# Patient Record
Sex: MALE | Race: WHITE | NOT HISPANIC OR LATINO | URBAN - METROPOLITAN AREA
[De-identification: names, ages, dates, MRNs, and addresses within clinical notes are randomized per-mention and may not be internally consistent; named-entity substitution may affect disease eponyms.]

---

## 2020-11-04 ENCOUNTER — OFFICE VISIT (OUTPATIENT)
Dept: ORTHOPEDIC SURGERY | Facility: CLINIC | Age: 55
End: 2020-11-04

## 2020-11-04 VITALS — WEIGHT: 261.2 LBS | OXYGEN SATURATION: 99 % | HEART RATE: 76 BPM | HEIGHT: 71 IN | BODY MASS INDEX: 36.57 KG/M2

## 2020-11-04 DIAGNOSIS — S46.012A RUPTURE OF LEFT SUPRASPINATUS TENDON, INITIAL ENCOUNTER: ICD-10-CM

## 2020-11-04 DIAGNOSIS — M25.512 LEFT SHOULDER PAIN, UNSPECIFIED CHRONICITY: Primary | ICD-10-CM

## 2020-11-04 DIAGNOSIS — Z02.6 ENCOUNTER RELATED TO WORKER'S COMPENSATION CLAIM: ICD-10-CM

## 2020-11-04 PROCEDURE — 99204 OFFICE O/P NEW MOD 45 MIN: CPT | Performed by: ORTHOPAEDIC SURGERY

## 2020-11-04 RX ORDER — LISINOPRIL AND HYDROCHLOROTHIAZIDE 12.5; 1 MG/1; MG/1
1 TABLET ORAL DAILY
COMMUNITY
Start: 2020-10-01

## 2020-11-04 NOTE — PROGRESS NOTES
[facility] Note    Subjective     Chief Complaint   Patient presents with   • Left Shoulder - Pain        HPI  NOE Henderson is a 55 y.o. male who presents with new problem of: left shoulder pain.  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 The issue has been ongoing for 4 week(s). Pain is a 5/10 on the pain scale. Pain is described as dull, burning and stabbing. Associated symptoms include pain and popping. The pain is worse with certain movements; ice and heat improve the pain. Previous treatments have included: nothing.    I have reviewed the following portions of the patient's history:History of Present Illness and review of systems.    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 No pertinent family history.  Social History     Socioeconomic History   • Marital status:      Spouse name: Not on file   • Number of children: Not on file   • Years of education: Not on file   • Highest education level: Not on file   Tobacco Use   • Smoking status: Never Smoker   • Smokeless tobacco: Former User     Types: Snuff   Substance and Sexual Activity   • Alcohol use: Yes     Alcohol/week: 2.0 standard drinks     Types: 2 Standard drinks or equivalent per week   • Drug use: No   • Sexual activity: Yes     Partners: Female     Birth control/protection: None      Current Outpatient Medications on File Prior to Visit   Medication Sig Dispense Refill   • lisinopril-hydrochlorothiazide (PRINZIDE,ZESTORETIC) 10-12.5 MG per tablet Take 1 tablet by mouth Daily.       No current facility-administered medications on file prior to visit.       No Known Allergies     The following portions  "of the patient's history were reviewed and updated as appropriate: allergies, current medications, past family history, past medical history, past social history, past surgical history and problem list.    Review of Systems   Constitutional: Negative.    HENT: Negative.    Eyes: Negative.    Respiratory: Negative.    Cardiovascular: Negative.    Gastrointestinal: Negative.    Endocrine: Negative.    Genitourinary: Negative.    Musculoskeletal: Positive for arthralgias.   Skin: Negative.    Allergic/Immunologic: Negative.    Neurological: Negative.    Hematological: Negative.    Psychiatric/Behavioral: Negative.         Objective      Physical Exam  Pulse 76   Ht 180.3 cm (70.98\")   Wt 118 kg (261 lb 3.2 oz)   SpO2 99%   BMI 36.45 kg/m²     Body mass index is 36.45 kg/m².    GENERAL APPEARANCE: awake, alert & oriented x 3, in no acute distress and well developed, well nourished  PSYCH: normal mood and affect  LUNGS:  breathing nonlabored, no wheezing  EYES: sclera anicteric, pupils equal  CARDIOVASCULAR: palpable pulses. Capillary refill less than 2 seconds  INTEGUMENTARY: skin intact, no clubbing, cyanosis  NEUROLOGIC:  Normal Sensation and reflexes       Ortho Exam  Musculoskeletal   Upper Extremity   Left Shoulder     Inspection and Palpation:     Tenderness - none     Crepitus - none    Sensation is normal    Examination reveals no ecchymosis.      Strength and Tone:    Supraspinatus, Infraspinatus - 5/5    Subscapularis - 5/5    Deltoid - 5/5   Range of Motion   Right shoulder:    Internal Rotation: ROM - T7    External Rotation: AROM - 80 degrees    Elevation through flexion: AROM - 180 degrees     Abduction - 180   Left Shoulder:    Internal Rotation: ROM - T7    External Rotation: AROM - 80 degrees    Elevation through flexion: AROM - 180 degrees     Abduction - 180 degrees   Instability   Left shoulder    Sulcus sign negative    Apprehension test negative    Chilo relocation test negative    Jerk test " negative   Impingement   Left shoulder    Howard impingement test positive    Neer impingement test positive   Functional Testing   Left shoulder    AC crossover adduction test negative    Abdominal compression test negative    Lift-off sign negative    Speed's test negative    Thacker's test negative    Horriblower's sign negative     Imaging/Studies  Imaging Results (Last 7 Days)     Procedure Component Value Units Date/Time    XR Shoulder 2+ View Left [[id number]] Resulted: 11/04/20 1030     Updated: 11/04/20 1031    Narrative:      Left Shoulder X-Ray  Indication: Pain  AP, scapular Y, and axillary lateral views    Findings:  No fracture  No bony lesion  Normal soft tissues  Normal joint spaces    No prior studies were available for comparison.        I viewed his MRI report from October 22 which shows a complete full-thickness tear of the supraspinatus and bursitis.  He is going to get the disc for me to review his images later.  I only have the report so far.  Assessment/Plan        ICD-10-CM ICD-9-CM   1. Left shoulder pain, unspecified chronicity  M25.512 719.41   2. Encounter related to worker's compensation claim  Z02.6 V70.3   3. Rupture of left supraspinatus tendon, initial encounter  S46.012A 840.6       Orders Placed This Encounter   Procedures   • XR Shoulder 2+ View Left   • Ambulatory Referral to Physical Therapy Evaluate and treat, Ortho      The plan is for 3 more weeks of physical therapy and exercise.  Continue work restrictions of use of the joystick only on the endloader and not the steering wheel.  If he is not better in 3 weeks I would recommend surgery.  If he is better we will continue therapy until he is well.    Medical Decision Making  Management Options : over-the-counter medicine and physical/occupational therapy  Data/Risk: radiology tests and independent visualization of imaging, lab tests, or EMG/NCV    Tim Mckay MD  11/04/20  11:08 EST         EMR Dragon/Transcription  disclaimer:  Much of this encounter note is an electronic transcription of spoken language to printed text. Electronic transcription of spoken language may permit erroneous, or at times, nonsensical words or phrases to be inadvertently transcribed. Although I have reviewed the note for such errors, some may still exist.

## 2020-12-02 ENCOUNTER — TREATMENT (OUTPATIENT)
Dept: PHYSICAL THERAPY | Facility: CLINIC | Age: 55
End: 2020-12-02

## 2020-12-02 DIAGNOSIS — M25.512 ACUTE PAIN OF LEFT SHOULDER: Primary | ICD-10-CM

## 2020-12-02 DIAGNOSIS — R53.1 WEAKNESS: ICD-10-CM

## 2020-12-02 DIAGNOSIS — M25.60 RANGE OF MOTION DEFICIT: ICD-10-CM

## 2020-12-02 PROCEDURE — 97110 THERAPEUTIC EXERCISES: CPT | Performed by: PHYSICAL THERAPIST

## 2020-12-02 PROCEDURE — 97140 MANUAL THERAPY 1/> REGIONS: CPT | Performed by: PHYSICAL THERAPIST

## 2020-12-02 PROCEDURE — 97112 NEUROMUSCULAR REEDUCATION: CPT | Performed by: PHYSICAL THERAPIST

## 2020-12-02 NOTE — PROGRESS NOTES
Physical Therapy Daily Progress Note      Patient: NOE Henderson   : 1965  Referring practitioner: Tim Mckay MD  Date of Initial Visit: Type: THERAPY  Noted: 2020  Today's Date: 2020  Patient seen for 1 sessions           Subjective Evaluation    History of Present Illness  Mechanism of injury: The pt stated that he lifted his arms over his head on  and experienced a pop in his L shoulder that resulted in an increase in pain and reduction in motion. He has had a general increase in pain at baseline along with increased sharp pain with elevation past appx 70 deg. He described the sensation as a very tight feeling and believes his shoulder is swollen. He tried to manage with ice and IcyHot but did not feel it helped. He had been doing very well with his HEP but has had trouble performing wall slides since Thursday.     Pain  Current pain ratin  At worst pain ratin  Location: L shoulder           Objective          Tenderness     Left Shoulder   No tenderness in the AC joint, biceps tendon (proximal), infraspinatus tendon and supraspinatus tendon.     Active Range of Motion   Left Shoulder   Flexion: 168 degrees   Abduction: 148 degrees with pain    Passive Range of Motion     Additional Passive Range of Motion Details  Full PROM with no pain    Tests     Left Shoulder   Positive full can and Hawkin's (somewhat provocative; not sharp).   Negative empty can.       See Exercise, Manual, and Modality Logs for complete treatment.       Assessment & Plan     Assessment  Assessment details: The pt presented to PT with a recent exacerbation of L shoulder pain that he experienced after raising his arm over his head to stretch on Thursday. He had no TTP in any of the cuff tendons but activation of the SS was painful for the first time today and it is possible that he has a new cuff strain, although I would like for the injury to calm down before jumping to conclusions. Pain  was reproduced in the clinic most frequently with activation of the external rotators and it is also likely he experienced an exacerbation of his current injury. He displayed compensations from the UT causing excessive scapular elevation when trying to raise his arm today and was educated on the importance of reducing this. This compensation had not been observed in his IE and is likely a response to pain. He had pain with [facility] wall slides so they were modified to a table slide and pain was reduced. MT was performed for the first time today to mobilize the capsule and was tolerated well up to end ranges, but end range flexion and abduction caused some degree of pain and was avoided today. He displayed appropriate performance of all other HEP exercises today but did require cuing for scapular position with fatigue.     Plan  Plan details: Assess new shoulder pain and progress HEP exercises as tolerated.         Visit Diagnoses:    ICD-10-CM ICD-9-CM   1. Acute pain of left shoulder  M25.512 719.41   2. Range of motion deficit  M25.60 719.50   3. Weakness  R53.1 780.79       Progress per Plan of Care           Timed:  Manual Therapy:    8     mins  75701;  Therapeutic Exercise:    28     mins  36991;     Neuromuscular Marina:    10    mins  56413;    Therapeutic Activity:     0     mins  52428;     Gait Trainin     mins  19717;     Ultrasound:     0     mins  67456;    Electrical Stimulation:    0     mins  16757 ( );    Untimed:  Electrical Stimulation:    0     mins  22559 ( );  Mechanical Traction:    0     mins  45193;     Timed Treatment:   46   mins   Total Treatment:     46   mins  Olvin Romero PT  Physical Therapist

## 2020-12-08 ENCOUNTER — TREATMENT (OUTPATIENT)
Dept: PHYSICAL THERAPY | Facility: CLINIC | Age: 55
End: 2020-12-08

## 2020-12-08 DIAGNOSIS — M25.60 RANGE OF MOTION DEFICIT: ICD-10-CM

## 2020-12-08 DIAGNOSIS — R53.1 WEAKNESS: ICD-10-CM

## 2020-12-08 DIAGNOSIS — M25.512 ACUTE PAIN OF LEFT SHOULDER: Primary | ICD-10-CM

## 2020-12-08 PROCEDURE — 97110 THERAPEUTIC EXERCISES: CPT | Performed by: PHYSICAL THERAPIST

## 2020-12-08 PROCEDURE — 97140 MANUAL THERAPY 1/> REGIONS: CPT | Performed by: PHYSICAL THERAPIST

## 2020-12-08 PROCEDURE — 97112 NEUROMUSCULAR REEDUCATION: CPT | Performed by: PHYSICAL THERAPIST

## 2020-12-08 NOTE — PROGRESS NOTES
Physical Therapy Daily Progress Note      Patient: NOE Henderson   : 1965  Referring practitioner: Tim Mckay MD  Date of Initial Visit: Type: THERAPY  Noted: 2020  Today's Date: 2020  Patient seen for 2 sessions           Subjective Evaluation    History of Present Illness  Mechanism of injury: The pt stated that his L shoulder has been feeling much better this week and reported he has recovered 99% from his recent exacerbation. He feels that his motion has returned and stated that pain has significantly decreased. He continues to report a pinching pain with reaching outside of his EMANUEL but noted it is mild and short-lived. He feels he is able to perform most of his household tasks without pain but struggles with higher intensity activities such as lifting heavy weight or reaching overhead with objects.    Pain  Current pain rating: 3  Location: L shoulder           Objective          Active Range of Motion   Left Shoulder   Flexion: 165 degrees   Abduction: 168 degrees       See Exercise, Manual, and Modality Logs for complete treatment.       Assessment & Plan     Assessment  Assessment details: The pt presented to PT with a reduction in shoulder pain compared to last week and seems to have recovered from his acute exacerbation and returned to his baseline of disability. He displayed good improvement into AROM into flexion and abduction today and had no painful arc or symptoms at end-range as has been observed in the past. He did a very good job stabilizing at the scapula when performing UE elevation and was aware of UT compensations when present and was able to self-correct. He displayed early onset of cuff fatigue with exercise and attempted to compensate with deltoid activation, but was cued out of it and encouraged to rest and reset. His HEP was progressed to include resisted wall slides, SL ER, and stretching of the pec mm to work on cuff strengthening and postural  reeducation.     Plan  Plan details: Continue progressions of cuff strengthening exercises as tolerated.         Visit Diagnoses:    ICD-10-CM ICD-9-CM   1. Acute pain of left shoulder  M25.512 719.41   2. Range of motion deficit  M25.60 719.50   3. Weakness  R53.1 780.79       Progress per Plan of Care           Timed:  Manual Therapy:    8     mins  71267;  Therapeutic Exercise:    22     mins  61693;     Neuromuscular Marina:    8    mins  55330;    Therapeutic Activity:     0     mins  69081;     Gait Trainin     mins  36013;     Ultrasound:     0     mins  91771;    Electrical Stimulation:    0     mins  35397 ( );    Untimed:  Electrical Stimulation:    0     mins  48065 ( );  Mechanical Traction:    0     mins  29703;     Timed Treatment:   38   mins   Total Treatment:     38   mins  Olvin Romero PT  Physical Therapist

## 2020-12-11 ENCOUNTER — TREATMENT (OUTPATIENT)
Dept: PHYSICAL THERAPY | Facility: CLINIC | Age: 55
End: 2020-12-11

## 2020-12-11 DIAGNOSIS — M25.512 ACUTE PAIN OF LEFT SHOULDER: Primary | ICD-10-CM

## 2020-12-11 DIAGNOSIS — M25.60 RANGE OF MOTION DEFICIT: ICD-10-CM

## 2020-12-11 DIAGNOSIS — R53.1 WEAKNESS: ICD-10-CM

## 2020-12-11 PROCEDURE — 97112 NEUROMUSCULAR REEDUCATION: CPT | Performed by: PHYSICAL THERAPIST

## 2020-12-11 PROCEDURE — 97110 THERAPEUTIC EXERCISES: CPT | Performed by: PHYSICAL THERAPIST

## 2020-12-11 PROCEDURE — 97140 MANUAL THERAPY 1/> REGIONS: CPT | Performed by: PHYSICAL THERAPIST

## 2020-12-11 NOTE — PROGRESS NOTES
Physical Therapy Daily Progress Note      Patient: NOE Henderson   : 1965  Referring practitioner: Tim Mckay MD  Date of Initial Visit: Type: THERAPY  Noted: 2020  Today's Date: 2020  Patient seen for 3 sessions           Subjective Evaluation    History of Present Illness  Mechanism of injury: The pt stated that he felt very fatigued after his last visit and has noticed increased soreness for the past couple of days. He also feels that his stiffness has worsened and reported a decrease in AROM. He has been compliant with HEP although he noted he has not been able to perform full reps due to irritation in his shoulder. He reported particular difficulty with SL ER.    Pain  Current pain ratin  At worst pain rating: 3  Location: L shoulder           Objective          Active Range of Motion   Left Shoulder   Flexion: 170 degrees   Abduction: 175 degrees   External rotation 0°: 20 degrees       See Exercise, Manual, and Modality Logs for complete treatment.       Assessment & Plan     Assessment  Assessment details: The pt displayed a reduction in ER AROM today although PROM remained full, pointing to cuff fatigue and dysfunction. He continues to have tightness of the posterior capsule and posterior corner mm so MT was directed at post jt mobs and stretching into IR. He was shown a sleeper stretch that was added to his HEP. SL ER proved very challenging today so it was modified to an AAROM with a cane and return to an isometric ER at the wall. AROM into flexion and abduction remained full so I prescribed exercises to strengthen into these positions but excessive scapular elevation was noted in the OKC with any amount of weight. These were left as CKC exercises with resistance added via a theraband. Ice was applied following exercise to manage symptoms.     Plan  Plan details: Continue progressions of AAROM exercises for ER and strengthening into flexion and extension.          Visit Diagnoses:    ICD-10-CM ICD-9-CM   1. Acute pain of left shoulder  M25.512 719.41   2. Weakness  R53.1 780.79   3. Range of motion deficit  M25.60 719.50       Progress per Plan of Care           Timed:  Manual Therapy:    10     mins  69673;  Therapeutic Exercise:    20     mins  70336;     Neuromuscular Marina:    10    mins  04574;    Therapeutic Activity:     0     mins  70414;     Gait Trainin     mins  32659;     Ultrasound:     0     mins  26165;    Electrical Stimulation:    0     mins  29079 ( );    Untimed:  Electrical Stimulation:    0     mins  04679 ( );  Mechanical Traction:    0     mins  19006;     Timed Treatment:   40   mins   Total Treatment:     52   mins  Olvin Romero PT  Physical Therapist

## 2020-12-16 ENCOUNTER — TREATMENT (OUTPATIENT)
Dept: PHYSICAL THERAPY | Facility: CLINIC | Age: 55
End: 2020-12-16

## 2020-12-16 ENCOUNTER — OFFICE VISIT (OUTPATIENT)
Dept: ORTHOPEDIC SURGERY | Facility: CLINIC | Age: 55
End: 2020-12-16

## 2020-12-16 VITALS — OXYGEN SATURATION: 98 % | BODY MASS INDEX: 36.54 KG/M2 | HEART RATE: 76 BPM | WEIGHT: 261 LBS | HEIGHT: 71 IN

## 2020-12-16 DIAGNOSIS — S46.012D RUPTURE OF LEFT SUPRASPINATUS TENDON, SUBSEQUENT ENCOUNTER: ICD-10-CM

## 2020-12-16 DIAGNOSIS — M25.512 ACUTE PAIN OF LEFT SHOULDER: Primary | ICD-10-CM

## 2020-12-16 DIAGNOSIS — M25.512 LEFT SHOULDER PAIN, UNSPECIFIED CHRONICITY: Primary | ICD-10-CM

## 2020-12-16 DIAGNOSIS — Z02.6 ENCOUNTER RELATED TO WORKER'S COMPENSATION CLAIM: ICD-10-CM

## 2020-12-16 DIAGNOSIS — M25.60 RANGE OF MOTION DEFICIT: ICD-10-CM

## 2020-12-16 DIAGNOSIS — R53.1 WEAKNESS: ICD-10-CM

## 2020-12-16 PROCEDURE — 97110 THERAPEUTIC EXERCISES: CPT | Performed by: PHYSICAL THERAPIST

## 2020-12-16 PROCEDURE — 99213 OFFICE O/P EST LOW 20 MIN: CPT | Performed by: ORTHOPAEDIC SURGERY

## 2020-12-16 PROCEDURE — 97112 NEUROMUSCULAR REEDUCATION: CPT | Performed by: PHYSICAL THERAPIST

## 2020-12-16 NOTE — PROGRESS NOTES
Physical Therapy Daily Progress Note      Patient: NOE Henderson   : 1965  Referring practitioner: Tim Mckay MD  Date of Initial Visit: Type: THERAPY  Noted: 2020  Today's Date: 2020  Patient seen for 4 sessions           Subjective Evaluation    History of Present Illness  Mechanism of injury: The pt stated that he had an exacerbation of posterior shoulder pain when reaching behind his body to get his seatbelt yesterday. He tried to manage with ice and Tylenol and felt it helped somewhat, but ultimately his pain was increased for several hours before subsiding. He continues to report good function of his shoulder with household tasks and work duties and is not interested in surgical intervention. He will be seeing Dr. Mckay later today.     Pain  Current pain ratin  Location: L shoulder           Objective          Active Range of Motion   Left Shoulder   Flexion: 170 degrees   Abduction: 170 degrees   External rotation 0°: 30 degrees   Internal rotation BTB: T6     Strength/Myotome Testing     Left Shoulder     Planes of Motion   Flexion: 4+   Abduction: 5   External rotation at 0°: 3+   Internal rotation at 0°: 4+       See Exercise, Manual, and Modality Logs for complete treatment.       Assessment & Plan     Assessment  Assessment details: The pt had an exacerbation of posterior L shoulder pain yesterday after reaching behind him to grab his seatbelt. He was encouraged to avoid reaching behind his body and was advised to use ice to manage inflammation. His shoulder function is improving steadily but active ER and ER AROM remain significantly limited. He is very active and tends to push through pain but was encouraged to allow time to heal and was advised to rest if pain was exacerbated with activity. As ER is his greatest limitation, this was targeted in the clinic today with stretching of anterior musculature and various exercises to activate the posterior cuff in  submaximal positions. He was easily fatigued with these exercises so multiple sets of low reps were required to maintain appropriate form. He did not report pain with any exercise in the clinic but did have soreness immediately following. Ice was applied to manage soreness. He will be seeing Dr. Mckay later this morning for a f/u. In my opinion, he is doing well and does not appear to be a candidate for surgery at this time.    Plan  Plan details: Continue progressions of ER exercises to activate the posterior cuff.         Visit Diagnoses:    ICD-10-CM ICD-9-CM   1. Acute pain of left shoulder  M25.512 719.41   2. Weakness  R53.1 780.79   3. Range of motion deficit  M25.60 719.50       Progress per Plan of Care           Timed:  Manual Therapy:    0     mins  10974;  Therapeutic Exercise:    25     mins  83290;     Neuromuscular Marina:    20    mins  33723;    Therapeutic Activity:     0     mins  19859;     Gait Trainin     mins  69961;     Ultrasound:     0     mins  62389;    Electrical Stimulation:    0     mins  64049 ( );    Untimed:  Electrical Stimulation:    0     mins  77647 ( );  Mechanical Traction:    0     mins  89281;     Timed Treatment:   45   mins   Total Treatment:     55   mins  Olvin Romero PT  Physical Therapist

## 2020-12-16 NOTE — PROGRESS NOTES
[facility]    Subjective     Chief Complaint   Patient presents with   • Follow-up     6 week recheck - Rupture of left supraspinatus tendon        HPI  NOE Henderson is a 55 y.o. male.  He says he is doing better.  I reviewed his physical therapy note from today and in summary they would like to continue physical therapy.  His range of motion did 170 degrees with strength 4+ out of 5.  He is improving.    Past Medical History:   Diagnosis Date   • High blood pressure       Past Surgical History:   Procedure Laterality Date   • COSMETIC SURGERY     • DENTAL PROCEDURE     • TONSILLECTOMY        History reviewed. No pertinent family history.  Social History     Socioeconomic History   • Marital status:      Spouse name: Not on file   • Number of children: Not on file   • Years of education: Not on file   • Highest education level: Not on file   Tobacco Use   • Smoking status: Never Smoker   • Smokeless tobacco: Former User     Types: Snuff   Substance and Sexual Activity   • Alcohol use: Yes     Alcohol/week: 2.0 standard drinks     Types: 2 Standard drinks or equivalent per week   • Drug use: No   • Sexual activity: Yes     Partners: Female     Birth control/protection: None      Current Outpatient Medications on File Prior to Visit   Medication Sig Dispense Refill   • lisinopril-hydrochlorothiazide (PRINZIDE,ZESTORETIC) 10-12.5 MG per tablet Take 1 tablet by mouth Daily.       No current facility-administered medications on file prior to visit.       No Known Allergies     The following portions of the patient's history were reviewed and updated as appropriate: allergies, current medications, past family history, past medical history, past social history, past surgical history and problem list.    Review of Systems   Constitutional: Negative.    HENT: Negative.    Eyes: Negative.    Respiratory: Negative.    Cardiovascular: Negative.    Gastrointestinal: Negative.   "  Endocrine: Negative.    Genitourinary: Negative.    Musculoskeletal: Positive for arthralgias.   Skin: Negative.    Allergic/Immunologic: Negative.    Neurological: Negative.    Hematological: Negative.    Psychiatric/Behavioral: Negative.         Objective      Physical Exam  Pulse 76   Ht 180.3 cm (70.98\")   Wt 118 kg (261 lb)   SpO2 98%   BMI 36.42 kg/m²     Body mass index is 36.42 kg/m².    GENERAL APPEARANCE: awake, alert & oriented x 3, in no acute distress and well developed, well nourished  PSYCH: normal mood and affect  LUNGS:  breathing nonlabored, no wheezing  Left shoulder has full motion.  4+ out of 5 strength.  Imaging/Studies  Imaging Results (Last 7 Days)     ** No results found for the last 168 hours. **        Previous MRI from October 22 which showed an incomplete full-thickness tear.  Assessment/Plan        ICD-10-CM ICD-9-CM   1. Left shoulder pain, unspecified chronicity  M25.512 719.41   2. Encounter related to worker's compensation claim  Z02.6 V70.3   3. Rupture of left supraspinatus tendon, subsequent encounter  S46.012D V58.89     840.6       Orders Placed This Encounter   Procedures   • Ambulatory Referral to Physical Therapy Evaluate and treat, Ortho      Continue physical therapy.  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  Follow-up in 1 month.    Medical Decision Making  Management Options : over-the-counter medicine and physical/occupational therapy  Records reviewed from physical therapy  Tim Mckay MD  12/16/20  11:09 EST         EMR Dragon/Transcription disclaimer:  Much of this encounter note is an electronic transcription of spoken language to printed text. Electronic transcription of spoken language may permit erroneous, or at times, nonsensical words or phrases to be inadvertently transcribed. Although I have reviewed the note for such errors, some may still exist.      "

## 2020-12-21 ENCOUNTER — TREATMENT (OUTPATIENT)
Dept: PHYSICAL THERAPY | Facility: CLINIC | Age: 55
End: 2020-12-21

## 2020-12-21 DIAGNOSIS — M25.60 RANGE OF MOTION DEFICIT: ICD-10-CM

## 2020-12-21 DIAGNOSIS — M25.512 ACUTE PAIN OF LEFT SHOULDER: Primary | ICD-10-CM

## 2020-12-21 DIAGNOSIS — R53.1 WEAKNESS: ICD-10-CM

## 2020-12-21 PROCEDURE — 97110 THERAPEUTIC EXERCISES: CPT | Performed by: PHYSICAL THERAPIST

## 2020-12-21 PROCEDURE — 97112 NEUROMUSCULAR REEDUCATION: CPT | Performed by: PHYSICAL THERAPIST

## 2020-12-21 NOTE — PROGRESS NOTES
Physical Therapy Daily Progress Note      Patient: NOE Henderson   : 1965  Referring practitioner: Tim Mckay MD  Date of Initial Visit: Type: THERAPY  Noted: 2020  Today's Date: 2020  Patient seen for 5 sessions           Subjective Evaluation    History of Present Illness  Mechanism of injury: The pt stated that he was very sore following his last visit and was able to manage with ice and NSAIDs. Soreness lasted for appx 2 days and returned to baseline over the weekend. He feels that he is being adequately challenged by his HEP and rehab exercises and is eager to progress. He saw Dr. Mckay last week who recommended he continue PT.    Pain  Current pain ratin  Location: L shoulder            Objective   See Exercise, Manual, and Modality Logs for complete treatment.       Assessment & Plan     Assessment  Assessment details: The pt reported significant soreness following his last visit so no real progressions in exercises were prescribed today. Rather, familiar exercises were performed at low weight and to fatigue rather than with a set number of reps to improve mm endurance. He displayed the ability to perform eccentric wall slides with resistance for the first time today and did not require and cuing for form correction after the initial explanation. This has been attempted in previous visits without success due to scapular compensations. His HEP was progressed to include this exercises along with end-range isometric shoulder ER.     Plan  Plan details: Continue progressions of cuff strengthening as tolerated with emphasis on ER.        Visit Diagnoses:    ICD-10-CM ICD-9-CM   1. Acute pain of left shoulder  M25.512 719.41   2. Weakness  R53.1 780.79   3. Range of motion deficit  M25.60 719.50       Progress per Plan of Care           Timed:  Manual Therapy:    0     mins  06784;  Therapeutic Exercise:    30     mins  18480;     Neuromuscular Marina:    10    mins  61665;     Therapeutic Activity:     0     mins  15372;     Gait Trainin     mins  19576;     Ultrasound:     0     mins  20636;    Electrical Stimulation:    0     mins  65392 ( );    Untimed:  Electrical Stimulation:    0     mins  36206 ( );  Mechanical Traction:    0     mins  78132;     Timed Treatment:   40   mins   Total Treatment:     40   mins  Olvin Romero PT  Physical Therapist

## 2020-12-30 ENCOUNTER — TREATMENT (OUTPATIENT)
Dept: PHYSICAL THERAPY | Facility: CLINIC | Age: 55
End: 2020-12-30

## 2020-12-30 DIAGNOSIS — M25.60 RANGE OF MOTION DEFICIT: ICD-10-CM

## 2020-12-30 DIAGNOSIS — R53.1 WEAKNESS: ICD-10-CM

## 2020-12-30 DIAGNOSIS — M25.512 ACUTE PAIN OF LEFT SHOULDER: Primary | ICD-10-CM

## 2020-12-30 PROCEDURE — 97110 THERAPEUTIC EXERCISES: CPT | Performed by: PHYSICAL THERAPIST

## 2020-12-30 PROCEDURE — 97112 NEUROMUSCULAR REEDUCATION: CPT | Performed by: PHYSICAL THERAPIST

## 2020-12-30 NOTE — PROGRESS NOTES
Re-Assessment / Re-Certification      Patient: NOE Henderson   : 1965  Diagnosis/ICD-10 Code:  Acute pain of left shoulder [M25.512]  Referring practitioner: Tim Mckay MD  Date of Initial Visit: Type: THERAPY  Noted: 2020  Today's Date: 2020  Patient seen for 6 sessions      Subjective:     Subjective Questionnaire: QuickDASH: 2  Clinical Progress: improved  Home Program Compliance: Yes  Treatment has included: therapeutic exercise, neuromuscular re-education, manual therapy and therapeutic activity    Subjective Evaluation    History of Present Illness  Mechanism of injury:  The pt stated that he fell on ice on [place] and sheyla his L shoulder, resulting in immediate onset of mild pain and soreness in the lateral shoulder. Pain resolved within a couple of hours and he feels back to baseline. He has been compliant with his HEP and feels that the progressions went well. He did not feel the eccentric wall slides were nearly as challenging this week.    Pain  Current pain ratin  Location: L shoulder         Objective          Palpation   Left   No palpable tenderness to the biceps, infraspinatus, levator scapulae, supraspinatus, teres minor and upper trapezius.   Hypertonic in the levator scapulae.     Right   No palpable tenderness to the biceps, infraspinatus, levator scapulae, supraspinatus, teres minor and upper trapezius.     Tenderness     Left Shoulder   No tenderness in the biceps tendon (proximal), coracoid process, infraspinatus tendon and supraspinatus tendon.     Neurological Testing     Sensation     Shoulder   Left Shoulder   Intact: light touch    Right Shoulder   Intact: light touch    Active Range of Motion   Left Shoulder   Flexion: 170 degrees   Abduction: 175 degrees   External rotation 0°: 40 degrees   Internal rotation BTB: T5     Right Shoulder   Flexion: 169 degrees   Abduction: 173 degrees   External rotation 0°: 65 degrees   Internal rotation BTB: T5      Strength/Myotome Testing     Left Shoulder     Planes of Motion   Flexion: 4+   Abduction: 5   External rotation at 0°: 3+   Internal rotation at 0°: 5     Right Shoulder     Planes of Motion   Flexion: 5   Abduction: 5   External rotation at 0°: 5   Internal rotation at 0°: 5       Assessment & Plan     Assessment  Impairments: abnormal muscle firing, abnormal or restricted ROM, impaired physical strength, lacks appropriate home exercise program and pain with function  Assessment details: The patient has responded very well to rehab thus far and is functioning very well with his ADLs and work tasks. He remains limited in ER ROM and strength but is slowly making progress with remaining ER musculature and assist from scapular retractors. Due to the extent of the cuff tears he will likely not regain full function in ER but is progressing as well as could be expected. Flexion and abduction strength and motion are nearly full so several functional, overhead exercises were introduced and added to his HEP. He was encouraged to stay active and to involve his L UE in daily function as tolerated. He has met all of his short term goals for rehab and is progressing nicely towards his long term goals.  Prognosis: good  Functional Limitations: carrying objects, lifting, reaching behind back, reaching overhead and unable to perform repetitive tasks  Goals  Plan Goals: Short Term Goals (4 weeks):     1. The patient will be independent and compliant with initial HEP. Met    2. The patient will report pain at rest 0/10 or less and worst pain 2/10 or less. Met    3. The patient will display decreased TTP in the L cuff tendons and dec mm tension in the surrounding musculature. Met    4.  L shoulder AROM will improve to flex 170 deg, abd 170 deg, ER 50 deg, IR T7 deg. Met in all but ER    5. The patient will demonstrate inc strength evidenced by MMT as follows: flex 4/5, abd 5/5, ER 4-/5, and IR 5/5. Met in all but ER    6. Quick  DASH will improve by 11 points or more. Met         Long Term Goals (8 weeks):     1. The patient will be appropriate for independent management and compliant with progressed HEP. Ongoing     2. The patient will report pain at rest 0/10 or less and worst pain 1/10 or less. Ongoing     3. L shoulder AROM will be within 5 degrees of the contralateral side in flex, abd, ER, and IR. Met in all but ER    4. The patient will return to work duties and/or ADLs with no limitations due to shoulder pain or dysfunction. Ongoing     5. The patient will return to recreational and community activities with no limitations due to shoulder pain or dysfunction. Ongoing       Plan  Therapy options: will be seen for skilled physical therapy services  Planned modality interventions: cryotherapy, iontophoresis, TENS, electrical stimulation/Russian stimulation and thermotherapy (hydrocollator packs)  Planned therapy interventions: ADL retraining, body mechanics training, flexibility, functional ROM exercises, home exercise program, manual therapy, neuromuscular re-education, postural training, soft tissue mobilization, strengthening, therapeutic activities, stretching and joint mobilization  Frequency: 1x week  Duration in visits: 8  Duration in weeks: 8  Treatment plan discussed with: patient  Plan details: Continue cuff strengthening as tolerated with emphasis on ER and OH activity.        Visit Diagnoses:    ICD-10-CM ICD-9-CM   1. Acute pain of left shoulder  M25.512 719.41   2. Weakness  R53.1 780.79   3. Range of motion deficit  M25.60 719.50       Progress toward previous goals: All Met    PT Signature: Olvin Romero PT      Based upon review of the patient's progress and continued therapy plan, it is my medical opinion that NOE Henderson should continue physical [facility]  [address] [facility] [address] [phone].     Signature:  __________________________________  Tim Mckay MD    Timed:  Manual Therapy:    0     mins  75414;  Therapeutic Exercise:    32     mins  90892;     Neuromuscular Marina:    10    mins  41316;    Therapeutic Activity:     0     mins  47350;     Gait Trainin     mins  11603;     Ultrasound:     0     mins  98209;    Electrical Stimulation:    0     mins  97258 ( );    Untimed:  Electrical Stimulation:    0     mins  78538 ( );  Mechanical Traction:    0     mins  85488;     Timed Treatment:   42   mins   Total Treatment:     42   mins

## 2021-01-04 ENCOUNTER — TREATMENT (OUTPATIENT)
Dept: PHYSICAL THERAPY | Facility: CLINIC | Age: 56
End: 2021-01-04

## 2021-01-04 DIAGNOSIS — M25.512 ACUTE PAIN OF LEFT SHOULDER: Primary | ICD-10-CM

## 2021-01-04 DIAGNOSIS — R53.1 WEAKNESS: ICD-10-CM

## 2021-01-04 DIAGNOSIS — M25.60 RANGE OF MOTION DEFICIT: ICD-10-CM

## 2021-01-04 PROCEDURE — 97110 THERAPEUTIC EXERCISES: CPT | Performed by: PHYSICAL THERAPIST

## 2021-01-04 PROCEDURE — 97112 NEUROMUSCULAR REEDUCATION: CPT | Performed by: PHYSICAL THERAPIST

## 2021-01-04 NOTE — PROGRESS NOTES
Physical Therapy Daily Progress Note      Patient: NOE Henderson   : 1965  Referring practitioner: Tim Mckay MD  Date of Initial Visit: Type: THERAPY  Noted: 2020  Today's Date: 2021  Patient seen for 7 sessions           Subjective Evaluation    History of Present Illness  Mechanism of injury: The pt stated that his shoulder has fully recovered from his recent fall and noted no changes from baseline. He has remained compliant with his HEP and reported that the progressions were appropriate, though he stated he was more fatigued than usual. He is pleased with his progress thus far and wants to continue getting stronger.    Pain  Current pain ratin  Location: L shoulder            Objective   See Exercise, Manual, and Modality Logs for complete treatment.       Assessment & Plan     Assessment  Assessment details: The pt was fatigued by the time he performed PNF exercises last visit so they were performed first today the change significantly improved the quality of motion. His forward flexion and abduction strength is improving nicely but ER remains limited. He is very motivated towards rehab and has been faithful with his HEP throughout treatment and I expect that his ER strength will improve with time and ongoing exercise. He was challenged with more aggressive [facility] strengthening of the cuff and scapular retractors today and tolerated exercises very well with no complaints of pain, although fatigue was more prevalent and rest breaks were advised to decrease compensations. Because he is doing so well, his visit frequency will be reduced and he is to continue aggressive strengthening independently between visits.     Plan  Plan details: The pt will attempt 3 weeks of independent management and will return for reassessment and HEP progressions.        Visit Diagnoses:    ICD-10-CM ICD-9-CM   1. Acute pain of left shoulder  M25.512 719.41   2. Weakness  R53.1 780.79   3. Range of  motion deficit  M25.60 719.50       Progress per Plan of Care           Timed:  Manual Therapy:    0     mins  62717;  Therapeutic Exercise:    30     mins  47574;     Neuromuscular Marina:    8    mins  99414;    Therapeutic Activity:     0     mins  46792;     Gait Trainin     mins  34195;     Ultrasound:     0     mins  82025;    Electrical Stimulation:    0     mins  08893 ( );    Untimed:  Electrical Stimulation:    0     mins  85985 ( );  Mechanical Traction:    0     mins  21766;     Timed Treatment:   38   mins   Total Treatment:     38   mins  Olvin Romero PT  Physical Therapist

## 2021-01-13 ENCOUNTER — OFFICE VISIT (OUTPATIENT)
Dept: ORTHOPEDIC SURGERY | Facility: CLINIC | Age: 56
End: 2021-01-13

## 2021-01-13 VITALS — HEIGHT: 71 IN | HEART RATE: 76 BPM | OXYGEN SATURATION: 99 % | WEIGHT: 260.14 LBS | BODY MASS INDEX: 36.42 KG/M2

## 2021-01-13 DIAGNOSIS — Z02.6 ENCOUNTER RELATED TO WORKER'S COMPENSATION CLAIM: Primary | ICD-10-CM

## 2021-01-13 DIAGNOSIS — S46.012D RUPTURE OF LEFT SUPRASPINATUS TENDON, SUBSEQUENT ENCOUNTER: ICD-10-CM

## 2021-01-13 PROCEDURE — 99212 OFFICE O/P EST SF 10 MIN: CPT | Performed by: ORTHOPAEDIC SURGERY

## 2021-01-13 NOTE — PROGRESS NOTES
[facility] [place] [facility] Note    Subjective     Chief Complaint   Patient presents with   • Follow-up     1 month f/u; Rupture of left supraspinatus tendon        HPI  NOE Henderson is a 55 y.o. male.  He says he is doing a bit better.  He is happy doing what he does at work.  He is able to operate the  with a joystick.  Would like to come back in 2 months.    Past Medical History:   Diagnosis Date   • High blood pressure       Past Surgical History:   Procedure Laterality Date   • COSMETIC SURGERY     • DENTAL PROCEDURE     • TONSILLECTOMY        History reviewed. No pertinent family history.  Social History     Socioeconomic History   • Marital status:      Spouse name: Not on file   • Number of children: Not on file   • Years of education: Not on file   • Highest education level: Not on file   Tobacco Use   • Smoking status: Never Smoker   • Smokeless tobacco: Former User     Types: Snuff   Substance and Sexual Activity   • Alcohol use: Yes     Alcohol/week: 2.0 standard drinks     Types: 2 Standard drinks or equivalent per week   • Drug use: No   • Sexual activity: Yes     Partners: Female     Birth control/protection: None      Current Outpatient Medications on File Prior to Visit   Medication Sig Dispense Refill   • lisinopril-hydrochlorothiazide (PRINZIDE,ZESTORETIC) 10-12.5 MG per tablet Take 1 tablet by mouth Daily.       No current facility-administered medications on file prior to visit.       No Known Allergies     The following portions of the patient's history were reviewed and updated as appropriate: allergies, current medications, past family history, past medical history, past social history, past surgical history and problem list.    Review of Systems   Constitutional: Negative.    HENT: Negative.    Eyes: Negative.    Respiratory: Negative.    Cardiovascular: Negative.    Gastrointestinal: Negative.    Endocrine: Negative.    Genitourinary: Negative.   "  Musculoskeletal: Positive for arthralgias.   Skin: Negative.    Allergic/Immunologic: Negative.    Neurological: Negative.    Hematological: Negative.    Psychiatric/Behavioral: Negative.         Objective      Physical Exam  Pulse 76   Ht 180.3 cm (70.98\")   Wt 118 kg (260 lb 2.3 oz)   SpO2 99%   BMI 36.30 kg/m²     Body mass index is 36.3 kg/m².    GENERAL APPEARANCE: awake, alert & oriented x 3, in no acute distress and well developed, well nourished  PSYCH: normal mood and affect  LUNGS:  breathing nonlabored, no wheezing  Left shoulder has full motion.  He has little bit of tenderness anteriorly strength is 4+ out of 5.    Imaging/Studies  Imaging Results (Last 7 Days)     ** No results found for the last 168 hours. **        Previous MRI with an incomplete full-thickness tear  Assessment/Plan        ICD-10-CM ICD-9-CM   1. Encounter related to worker's compensation claim  Z02.6 V70.3   2. Rupture of left supraspinatus tendon, subsequent encounter  S46.012D V58.89     840.6     He will continue physical therapy and continue work restrictions of operating the end  with a joystick.  Follow-up in 2 months.  Medical Decision Making  Management Options : over-the-counter medicine and physical/occupational therapy    Tim Mckay MD  01/13/21  09:04 EST         EMR Dragon/Transcription disclaimer:  Much of this encounter note is an electronic transcription of spoken language to printed text. Electronic transcription of spoken language may permit erroneous, or at times, nonsensical words or phrases to be inadvertently transcribed. Although I have reviewed the note for such errors, some may still exist.      "

## 2021-01-21 ENCOUNTER — TREATMENT (OUTPATIENT)
Dept: PHYSICAL THERAPY | Facility: CLINIC | Age: 56
End: 2021-01-21

## 2021-01-21 DIAGNOSIS — R53.1 WEAKNESS: ICD-10-CM

## 2021-01-21 DIAGNOSIS — M25.512 ACUTE PAIN OF LEFT SHOULDER: Primary | ICD-10-CM

## 2021-01-21 DIAGNOSIS — M25.60 RANGE OF MOTION DEFICIT: ICD-10-CM

## 2021-01-21 PROCEDURE — 97110 THERAPEUTIC EXERCISES: CPT | Performed by: PHYSICAL THERAPIST

## 2021-01-22 NOTE — PROGRESS NOTES
Physical Therapy Daily Progress Note      Patient: NOE Henderson   : 1965  Referring practitioner: Tim Mckay MD  Date of Initial Visit: Type: THERAPY  Noted: 2020  Today's Date: 2021  Patient seen for 8 sessions           Subjective Evaluation    History of Present Illness  Mechanism of injury: The patient reported that he had an exacerbation of shoulder pain after his last visit and discontinued exercise at home this week as a result. He also noted a reduction in AROM and stated it has been difficult to get the milk off the top shelf of the fridge. He has still been able to tolerate all activities at work but was discouraged by his setback.    Pain  Current pain ratin  Location: L shoulder            Objective          Active Range of Motion   Left Shoulder   Flexion: 165 degrees   Abduction: 175 degrees       See Exercise, Manual, and Modality Logs for complete treatment.       Assessment & Plan     Assessment  Assessment details: The patient experienced an exacerbation of symptoms after his last visit and has discontinued exercise at home so today's visit emphasized return to exercise. Intensity of exercises were reduced and his HEP was regressed to resisted CKC AROM exercises. He demonstrated good performance of each exercise prescribed in the clinic with the exception of TB ER which proved to be too challenging. Fatigue was an issue today and limited high rep activities. He was encouraged to rest and reset if fatigue compromised function at home. His AROM was not reduced compared to his last visit but it was more challenging to achieve end ranges. He was advised to ice as needed at home.    Plan  Plan details: Continue progressions of CKC cuff strengthening as tolerated.        Visit Diagnoses:    ICD-10-CM ICD-9-CM   1. Acute pain of left shoulder  M25.512 719.41   2. Weakness  R53.1 780.79   3. Range of motion deficit  M25.60 719.50       Progress per Plan of Care            Timed:  Manual Therapy:    0     mins  43369;  Therapeutic Exercise:    45     mins  50292;     Neuromuscular Marina:    0    mins  77285;    Therapeutic Activity:     0     mins  72354;     Gait Trainin     mins  83168;     Ultrasound:     0     mins  58220;    Electrical Stimulation:    0     mins  00111 ( );    Untimed:  Electrical Stimulation:    0     mins  63217 ( );  Mechanical Traction:    0     mins  57276;     Timed Treatment:   45   mins   Total Treatment:     45   mins  Olvin Romero PT  Physical Therapist

## 2021-02-10 ENCOUNTER — TREATMENT (OUTPATIENT)
Dept: PHYSICAL THERAPY | Facility: CLINIC | Age: 56
End: 2021-02-10

## 2021-02-10 DIAGNOSIS — M25.512 ACUTE PAIN OF LEFT SHOULDER: Primary | ICD-10-CM

## 2021-02-10 DIAGNOSIS — M25.60 RANGE OF MOTION DEFICIT: ICD-10-CM

## 2021-02-10 DIAGNOSIS — R53.1 WEAKNESS: ICD-10-CM

## 2021-02-10 PROCEDURE — 97110 THERAPEUTIC EXERCISES: CPT | Performed by: PHYSICAL THERAPIST

## 2021-02-10 NOTE — PROGRESS NOTES
Physical Therapy Daily Treatment Note      Patient: NOE Henderson   : 1965  Referring practitioner: Tim Mckay MD  Date of Initial Visit: Type: THERAPY  Noted: 2020  Today's Date: 2/10/2021  Patient seen for 9 sessions           Subjective Evaluation    History of Present Illness  Mechanism of injury: The pt stated that his shoulder pain has been largely unchanged since his last visit and reported he has fully recovered from his exacerbation earlier in the year. He continues to report a mild painful arc and this has been his only complaint in regards to dysfunction. He remains very active and stated he feels somewhat limited when working on his car but attributes this to normal fatigue and soreness. He does not feel limited in his work duties. He requested new activities to work on at home and feels his HEP is too easy.     Pain  Current pain ratin  At worst pain ratin  Location: L shoulder           Objective          Palpation   Left   No palpable tenderness to the biceps, infraspinatus, levator scapulae, supraspinatus, teres minor and upper trapezius.   Hypertonic in the levator scapulae.     Right   No palpable tenderness to the biceps, infraspinatus, levator scapulae, supraspinatus, teres minor and upper trapezius.     Tenderness     Left Shoulder   No tenderness in the biceps tendon (proximal), coracoid process, infraspinatus tendon and supraspinatus tendon.     Neurological Testing     Sensation     Shoulder   Left Shoulder   Intact: light touch    Right Shoulder   Intact: light touch    Active Range of Motion   Left Shoulder   Flexion: 175 degrees   Abduction: 175 degrees   External rotation 0°: 52 degrees   Internal rotation BTB: T5     Right Shoulder   Flexion: 169 degrees   Abduction: 173 degrees   External rotation 0°: 65 degrees   Internal rotation BTB: T5     Strength/Myotome Testing     Left Shoulder     Planes of Motion   Flexion: 5   Abduction: 5   External  rotation at 0°: 4   Internal rotation at 0°: 5     Right Shoulder     Planes of Motion   Flexion: 5   Abduction: 5   External rotation at 0°: 5   Internal rotation at 0°: 5       See Exercise, Manual, and Modality Logs for complete treatment.       Assessment & Plan     Assessment  Impairments: abnormal muscle firing, abnormal or restricted ROM, impaired physical strength, lacks appropriate home exercise program and pain with function  Assessment details: The patient recovered from his recent exacerbation of symptoms and L shoulder function has largely returned to normal. He continues to demonstrate a painful arc from appx 100-120 deg with pain in the SS but also displayed some irritation of the proximal biceps. The painful arc was completely resolved with an inferior GH jt MWM and this was added to his HEP. Eccentric biceps activities and proximal biceps stretches were also introduced and prescribed. He continues to display reduced ER strength and ROM so this was targeted with progressed cuff strengthening activities, but he was fatigued very easily. Functionally, the pt is able to perform all of his work and home responsibilities but I would like to see improved ER strength and a reduced painful arc before d/c.  Prognosis: good  Functional Limitations: carrying objects, lifting, reaching behind back, reaching overhead and unable to perform repetitive tasks  Goals  Plan Goals: Short Term Goals (4 weeks):     1. The patient will be independent and compliant with initial HEP. Met    2. The patient will report pain at rest 0/10 or less and worst pain 2/10 or less. Met    3. The patient will display decreased TTP in the L cuff tendons and dec mm tension in the surrounding musculature. Met    4.  L shoulder AROM will improve to flex 170 deg, abd 170 deg, ER 50 deg, IR T7 deg. Met     5. The patient will demonstrate inc strength evidenced by MMT as follows: flex 4/5, abd 5/5, ER 4-/5, and IR 5/5. Met     6. Quick DASH  will improve by 11 points or more. Met         Long Term Goals (8 weeks):     1. The patient will be appropriate for independent management and compliant with progressed HEP. Ongoing     2. The patient will report pain at rest 0/10 or less and worst pain 1/10 or less. Ongoing     3. L shoulder AROM will be within 5 degrees of the contralateral side in flex, abd, ER, and IR. Met in all but ER    4. The patient will return to work duties and/or ADLs with no limitations due to shoulder pain or dysfunction. Met    5. The patient will return to recreational and community activities with no limitations due to shoulder pain or dysfunction. Ongoing       Plan  Therapy options: will be seen for skilled physical therapy services  Planned modality interventions: cryotherapy, iontophoresis, TENS, electrical stimulation/Russian stimulation and thermotherapy (hydrocollator packs)  Planned therapy interventions: ADL retraining, body mechanics training, flexibility, functional ROM exercises, home exercise program, manual therapy, neuromuscular re-education, postural training, soft tissue mobilization, strengthening, therapeutic activities, stretching and joint mobilization  Frequency: 1x week  Duration in visits: 8  Duration in weeks: 8  Treatment plan discussed with: patient  Plan details: Continue cuff strengthening as tolerated with emphasis on ER. Assess response to MWM and eccentric biceps activities.        Visit Diagnoses:    ICD-10-CM ICD-9-CM   1. Acute pain of left shoulder  M25.512 719.41   2. Weakness  R53.1 780.79   3. Range of motion deficit  M25.60 719.50       Progress per Plan of Care           Timed:  Manual Therapy:    0     mins  27572;  Therapeutic Exercise:    42     mins  49295;     Neuromuscular Marina:    0    mins  56150;    Therapeutic Activity:     0     mins  46398;     Gait Trainin     mins  35728;     Ultrasound:     0     mins  33326;    Electrical Stimulation:    0     mins  46098 (  );    Untimed:  Electrical Stimulation:    0     mins  25462 ( );  Mechanical Traction:    0     mins  35612;     Timed Treatment:   42   mins   Total Treatment:     42   mins  Olvin Romero PT  Physical Therapist

## 2021-03-10 ENCOUNTER — TREATMENT (OUTPATIENT)
Dept: PHYSICAL THERAPY | Facility: CLINIC | Age: 56
End: 2021-03-10

## 2021-03-10 DIAGNOSIS — M25.512 ACUTE PAIN OF LEFT SHOULDER: Primary | ICD-10-CM

## 2021-03-10 DIAGNOSIS — M25.60 RANGE OF MOTION DEFICIT: ICD-10-CM

## 2021-03-10 DIAGNOSIS — R53.1 WEAKNESS: ICD-10-CM

## 2021-03-10 PROCEDURE — 97110 THERAPEUTIC EXERCISES: CPT | Performed by: PHYSICAL THERAPIST

## 2021-03-10 NOTE — PROGRESS NOTES
Re-Assessment / Re-Certification      Patient: NOE Henderson   : 1965  Diagnosis/ICD-10 Code:  Acute pain of left shoulder [M25.512]  Referring practitioner: Tim Mckay MD  Date of Initial Visit: Type: THERAPY  Noted: 2020  Today's Date: 3/10/2021  Patient seen for 10 sessions      Subjective:     Subjective Questionnaire: QuickDASH: 35  Clinical Progress: unchanged  Home Program Compliance: Yes  Treatment has included: therapeutic exercise, neuromuscular re-education, manual therapy and therapeutic activity    Subjective Evaluation    History of Present Illness  Mechanism of injury: The pt stated that he was working overhead on his car for about 15 minutes on Saturday and had a large increase in L shoulder pain to a 5/10. Pain persisted for the next two days until returning to baseline yesterday. He struggled with lifting overhead during those two days and reported difficulty reaching to the top shelf of the fridge to grab a gallon of milk. He has remained compliant with his HEP and stated he is pain-free with inf MWM but continues to have an intermittent painful arc with lifting during the day. He has not noticed any improvement in ER strength. He does not want surgery.     Pain  Current pain ratin  At worst pain ratin  Location: L shoulder         Objective          Palpation   Left   No palpable tenderness to the biceps, infraspinatus, levator scapulae, supraspinatus, teres minor and upper trapezius.   Hypertonic in the levator scapulae.     Right   No palpable tenderness to the biceps, infraspinatus, levator scapulae, supraspinatus, teres minor and upper trapezius.     Tenderness     Left Shoulder   No tenderness in the biceps tendon (proximal), coracoid process, infraspinatus tendon and supraspinatus tendon.     Neurological Testing     Sensation     Shoulder   Left Shoulder   Intact: light touch    Right Shoulder   Intact: light touch    Active Range of Motion   Left Shoulder    Flexion: 175 degrees   Abduction: 175 degrees   External rotation 0°: 52 degrees   Internal rotation BTB: T5     Right Shoulder   Flexion: 169 degrees   Abduction: 173 degrees   External rotation 0°: 65 degrees   Internal rotation BTB: T5     Strength/Myotome Testing     Left Shoulder     Planes of Motion   Flexion: 5   Abduction: 5   External rotation at 0°: 4-   Internal rotation at 0°: 5     Right Shoulder     Planes of Motion   Flexion: 5   Abduction: 5   External rotation at 0°: 5   Internal rotation at 0°: 5       Assessment & Plan     Assessment  Impairments: abnormal muscle firing, abnormal or restricted ROM, impaired physical strength, lacks appropriate home exercise program and pain with function  Assessment details: The patient is no longer experiencing biceps pain but his cuff symptoms remain unchanged in the last month. He continues to have a mild painful arc and weakness with ER indicative of persistent cuff dysfunction. He has continued to perform all work and household duties with minimal limitations but struggles to perform recreational activities such as fixing old cars due to pain and weakness in prolonged overhead positions. He has been faithful with strengthening exercises since beginning PT 4 months ago but has not seen improvement in ER strength and may not with ongoing independent management. His ROM and strength have improved to normal limits in all other planes and he is functioning fairly well overall. He would likely benefit from independent management and was prescribed an HEP for progressed cuff and scapular strengthening.   Prognosis: good  Functional Limitations: carrying objects, lifting, reaching behind back, reaching overhead and unable to perform repetitive tasks  Goals  Plan Goals: Short Term Goals (4 weeks):     1. The patient will be independent and compliant with initial HEP. Met    2. The patient will report pain at rest 0/10 or less and worst pain 2/10 or less. Met    3.  The patient will display decreased TTP in the L cuff tendons and dec mm tension in the surrounding musculature. Met    4.  L shoulder AROM will improve to flex 170 deg, abd 170 deg, ER 50 deg, IR T7 deg. Met     5. The patient will demonstrate inc strength evidenced by MMT as follows: flex 4/5, abd 5/5, ER 4-/5, and IR 5/5. Met     6. Quick DASH will improve by 11 points or more. Met         Long Term Goals (8 weeks):     1. The patient will be appropriate for independent management and compliant with progressed HEP. Ongoing     2. The patient will report pain at rest 0/10 or less and worst pain 1/10 or less. Ongoing     3. L shoulder AROM will be within 5 degrees of the contralateral side in flex, abd, ER, and IR. Met in all but ER    4. The patient will return to work duties and/or ADLs with no limitations due to shoulder pain or dysfunction. Met    5. The patient will return to recreational and community activities with no limitations due to shoulder pain or dysfunction. Ongoing       Plan  Therapy options: will be seen for skilled physical therapy services  Planned modality interventions: cryotherapy, iontophoresis, TENS, electrical stimulation/Russian stimulation and thermotherapy (hydrocollator packs)  Planned therapy interventions: ADL retraining, body mechanics training, flexibility, functional ROM exercises, home exercise program, manual therapy, neuromuscular re-education, postural training, soft tissue mobilization, strengthening, therapeutic activities, stretching and joint mobilization  Frequency: 1x week  Duration in visits: 8  Duration in weeks: 8  Treatment plan discussed with: patient  Plan details: The pt will attempt several months of independent management and will return for a re-evaluation in the summer.         Visit Diagnoses:    ICD-10-CM ICD-9-CM   1. Acute pain of left shoulder  M25.512 719.41   2. Weakness  R53.1 780.79   3. Range of motion deficit  M25.60 719.50       Progress toward  previous goals: Partially Met    PT Signature: Olvin Romero PT      Based upon review of the patient's progress and continued therapy plan, it is my medical opinion that NOE Henderson should continue physical therapy treatment at [facility]  [facility] [address] [facility]  [place] [address] [phone].    Signature: __________________________________  Tim Mckay MD    Timed:  Manual Therapy:    0     mins  77103;  Therapeutic Exercise:    40     mins  52598;     Neuromuscular Marina:    0    mins  30406;    Therapeutic Activity:     0     mins  65117;     Gait Trainin     mins  08036;     Ultrasound:     0     mins  91394;    Electrical Stimulation:    0     mins  21908 ( );    Untimed:  Electrical Stimulation:    0     mins  42000 ( );  Mechanical Traction:    0     mins  02737;     Timed Treatment:   40   mins   Total Treatment:     40   mins

## 2021-04-07 ENCOUNTER — OFFICE VISIT (OUTPATIENT)
Dept: ORTHOPEDIC SURGERY | Facility: CLINIC | Age: 56
End: 2021-04-07

## 2021-04-07 VITALS
DIASTOLIC BLOOD PRESSURE: 88 MMHG | BODY MASS INDEX: 38.36 KG/M2 | HEIGHT: 71 IN | WEIGHT: 274 LBS | SYSTOLIC BLOOD PRESSURE: 142 MMHG

## 2021-04-07 DIAGNOSIS — Z02.6 ENCOUNTER RELATED TO WORKER'S COMPENSATION CLAIM: Primary | ICD-10-CM

## 2021-04-07 DIAGNOSIS — S46.012D RUPTURE OF LEFT SUPRASPINATUS TENDON, SUBSEQUENT ENCOUNTER: ICD-10-CM

## 2021-04-07 PROCEDURE — 99214 OFFICE O/P EST MOD 30 MIN: CPT | Performed by: ORTHOPAEDIC SURGERY

## 2021-04-07 NOTE — PROGRESS NOTES
"      [facility] Note    Subjective     CC: Follow-up (3 MONTH f/u--Rupture of left supraspinatus tendon)      HPI    NOE Henderson is a 55 y.o. male.  He is follow-up his left shoulder cuff tear.  He has had pain for 9 months.  It is Worker's Comp.  He wants to work full duty and has been.    Review of Systems   Constitutional: Negative.  Negative for chills, fatigue and fever.   HENT: Negative.  Negative for congestion and dental problem.    Eyes: Negative.  Negative for blurred vision.   Respiratory: Negative.  Negative for shortness of breath.    Cardiovascular: Negative.  Negative for leg swelling.   Gastrointestinal: Negative.  Negative for abdominal pain.   Endocrine: Negative.  Negative for polyuria.   Genitourinary: Negative.  Negative for difficulty urinating.   Musculoskeletal: Positive for arthralgias.   Skin: Negative.    Allergic/Immunologic: Negative.    Neurological: Negative.    Hematological: Negative.  Negative for adenopathy.   Psychiatric/Behavioral: Negative.  Negative for behavioral problems.       ROS:    Constiutional:Pt denies fever, chills, nausea, or vomiting.  MSK:as above      Objective      Past Medical History  Past Medical History:   Diagnosis Date   • High blood pressure          Physical Exam  /88   Ht 180.3 cm (70.98\")   Wt 124 kg (274 lb)   BMI 38.23 kg/m²     Body mass index is 38.23 kg/m².    Patient is well nourished and well developed.        Ortho Exam  Left shoulder has positive impingement and weakness of the rotator cuff and his supraspinatus testing.  He has full motion.    Imaging/Labs/EMG Reviewed:  Imaging Results (Last 24 Hours)     ** No results found for the last 24 hours. **      Viewed his MRI from October 22 which shows a full-thickness cuff tear    Assessment:  1. Encounter related to worker's compensation claim    2. Rupture of left supraspinatus tendon, subsequent encounter        Plan:  1. I recommend left shoulder " arthroscopy and repair of his full-thickness cuff tear.Treatment options and alternatives were discussed with patient.  Surgical risks include but are not limited to pain, bleeding, infection, failure to relieve symptoms, need for further procedures, recurrence of symptoms, damage to healthy adjacent structures, hardware loosening/failure, stiffness, weakness, scar, blood clots/DVT/PE, loss of limb or life. We also discussed the postoperative protocol and expected outcome although no guarantees are possible with surgery. All questions were answered; the patient would like to proceed with surgical intervention.  We will continue to work full duty.  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    Follow Up:   Return for 1 week after surgery.      Medical Decision Making  Management Options : Moderate - Decision regarding minor surgery with identified patient  or procedure risk factors        Tim Mckay M.D., [facility]  Orthopedic Surgeon  Fellowship Trained Sports Medicine  [facility]  Orthopedics and Sports Medicine  [address] [place] [address], [address]  [place] [address]

## 2021-05-23 ENCOUNTER — APPOINTMENT (OUTPATIENT)
Dept: PREADMISSION TESTING | Facility: HOSPITAL | Age: 56
End: 2021-05-23

## 2021-05-23 LAB — SARS-COV-2 RNA NOSE QL NAA+PROBE: NOT DETECTED

## 2021-05-23 PROCEDURE — C9803 HOPD COVID-19 SPEC COLLECT: HCPCS

## 2021-05-23 PROCEDURE — U0004 COV-19 TEST NON-CDC HGH THRU: HCPCS

## 2021-05-25 ENCOUNTER — LAB REQUISITION (OUTPATIENT)
Dept: LAB | Facility: HOSPITAL | Age: 56
End: 2021-05-25

## 2021-05-25 DIAGNOSIS — I10 ESSENTIAL (PRIMARY) HYPERTENSION: ICD-10-CM

## 2021-05-25 LAB — POTASSIUM SERPL-SCNC: 4.3 MMOL/L (ref 3.5–5.2)

## 2021-05-25 PROCEDURE — 84132 ASSAY OF SERUM POTASSIUM: CPT | Performed by: ORTHOPAEDIC SURGERY

## 2021-06-07 ENCOUNTER — APPOINTMENT (OUTPATIENT)
Dept: PREADMISSION TESTING | Facility: HOSPITAL | Age: 56
End: 2021-06-07

## 2021-06-07 LAB — SARS-COV-2 RNA PNL SPEC NAA+PROBE: NOT DETECTED

## 2021-06-07 PROCEDURE — C9803 HOPD COVID-19 SPEC COLLECT: HCPCS

## 2021-06-07 PROCEDURE — U0004 COV-19 TEST NON-CDC HGH THRU: HCPCS

## 2021-06-10 ENCOUNTER — OUTSIDE FACILITY SERVICE (OUTPATIENT)
Dept: ORTHOPEDIC SURGERY | Facility: CLINIC | Age: 56
End: 2021-06-10

## 2021-06-10 DIAGNOSIS — Z98.890 S/P LEFT ROTATOR CUFF REPAIR: Primary | ICD-10-CM

## 2021-06-10 PROCEDURE — 15777 ACELLULAR DERM MATRIX IMPLT: CPT | Performed by: ORTHOPAEDIC SURGERY

## 2021-06-10 PROCEDURE — 29827 SHO ARTHRS SRG RT8TR CUF RPR: CPT | Performed by: ORTHOPAEDIC SURGERY

## 2021-06-10 RX ORDER — OXYCODONE HYDROCHLORIDE AND ACETAMINOPHEN 5; 325 MG/1; MG/1
1 TABLET ORAL EVERY 6 HOURS PRN
Qty: 20 TABLET | Refills: 0 | Status: SHIPPED | OUTPATIENT
Start: 2021-06-10 | End: 2021-07-07

## 2021-06-10 RX ORDER — ONDANSETRON 4 MG/1
4 TABLET, FILM COATED ORAL EVERY 8 HOURS PRN
Qty: 10 TABLET | Refills: 1 | Status: SHIPPED | OUTPATIENT
Start: 2021-06-10

## 2021-06-16 ENCOUNTER — OFFICE VISIT (OUTPATIENT)
Dept: ORTHOPEDIC SURGERY | Facility: CLINIC | Age: 56
End: 2021-06-16

## 2021-06-16 VITALS — TEMPERATURE: 97.9 F

## 2021-06-16 DIAGNOSIS — Z02.6 ENCOUNTER RELATED TO WORKER'S COMPENSATION CLAIM: ICD-10-CM

## 2021-06-16 DIAGNOSIS — Z98.890 S/P LEFT ROTATOR CUFF REPAIR: Primary | ICD-10-CM

## 2021-06-16 PROCEDURE — 99024 POSTOP FOLLOW-UP VISIT: CPT | Performed by: ORTHOPAEDIC SURGERY

## 2021-06-16 NOTE — PROGRESS NOTES
Chief Complaint   Patient presents with   • Post-op     6 days s/p (L) shoulder arthroscopy with RCR repair, using double row repair with Smith and Nephew anchors and Regeneten Collagen implant 06/10/2021            HPI    He is 6 days postop.  He is doing well.  He did not take any pain medicine    Vitals:    06/16/21 0818   Temp: 97.9 °F (36.6 °C)         Physical Exam:  I removed his dressing.  Wounds look good.  Distally neurologically intact.  He is wearing a sling.        ICD-10-CM ICD-9-CM   1. S/P left rotator cuff repair  Z98.890 V45.89   2. Encounter related to worker's compensation claim  Z02.6 V70.3     He will follow up in 3 weeks.  Continue sling.  Work restrictions are no use left arm or off work.

## 2021-07-07 ENCOUNTER — OFFICE VISIT (OUTPATIENT)
Dept: ORTHOPEDIC SURGERY | Facility: CLINIC | Age: 56
End: 2021-07-07

## 2021-07-07 VITALS — WEIGHT: 273.37 LBS | TEMPERATURE: 97.5 F | BODY MASS INDEX: 38.27 KG/M2 | HEIGHT: 71 IN

## 2021-07-07 DIAGNOSIS — Z02.6 ENCOUNTER RELATED TO WORKER'S COMPENSATION CLAIM: ICD-10-CM

## 2021-07-07 DIAGNOSIS — Z98.890 S/P LEFT ROTATOR CUFF REPAIR: Primary | ICD-10-CM

## 2021-07-07 PROCEDURE — 99024 POSTOP FOLLOW-UP VISIT: CPT | Performed by: ORTHOPAEDIC SURGERY

## 2021-07-07 NOTE — PROGRESS NOTES
Chief Complaint   Patient presents with   • Left Shoulder - Post-op     21 days s/p (L) shoulder arthroscopy with RCR repair, using double row repair with Smith and Nephew anchors and Regeneten Collagen implant 06/10/2021            HPI  He is a month out from his left shoulder rotator cuff repair.  He is doing well.  No complaints.  He says his shoulder is not having any pain      Vitals:    07/07/21 0841   Temp: 97.5 °F (36.4 °C)         Physical Exam:  Portals look good.  Neurologically intact.        ICD-10-CM ICD-9-CM   1. S/P left rotator cuff repair  Z98.890 V45.89   2. Encounter related to worker's compensation claim  Z02.6 V70.3       Orders Placed This Encounter   Procedures   • Ambulatory Referral to Physical Therapy Evaluate and treat, Ortho, POST OP     He will start physical therapy Juan R Gonzales.  Follow-up in 1 month.  He is Worker's Comp.  Work restrictions are no use left arm

## 2021-07-13 ENCOUNTER — TREATMENT (OUTPATIENT)
Dept: PHYSICAL THERAPY | Facility: CLINIC | Age: 56
End: 2021-07-13

## 2021-07-13 DIAGNOSIS — R53.1 WEAKNESS: ICD-10-CM

## 2021-07-13 DIAGNOSIS — M25.60 RANGE OF MOTION DEFICIT: ICD-10-CM

## 2021-07-13 DIAGNOSIS — M25.512 ACUTE PAIN OF LEFT SHOULDER: Primary | ICD-10-CM

## 2021-07-13 PROCEDURE — 97110 THERAPEUTIC EXERCISES: CPT | Performed by: PHYSICAL THERAPIST

## 2021-07-13 PROCEDURE — 97162 PT EVAL MOD COMPLEX 30 MIN: CPT | Performed by: PHYSICAL THERAPIST

## 2021-07-13 NOTE — PROGRESS NOTES
Physical Therapy Initial Evaluation and Plan of Care    Patient: Doron Henderson   : 1965  Diagnosis/ICD-10 Code:  Acute pain of left shoulder [M25.512]  Referring practitioner: Tim Mckay MD  Date of Initial Visit: 2021  Today's Date: 2021  Patient seen for 1 sessions           Subjective Questionnaire: QuickDASH: 50      Subjective Evaluation    History of Present Illness  Mechanism of injury: The pt underwent a repair of a massive supraspinatus tear on 6/10/21 after he continued to have pain and dysfunction after several months of physical therapy. He stated his pain has been mild since surgery and he not taking any medication. He has had a couple instances of sharp pain with raising his arm since coming out of the sling last week but he has been able to manage with ice and rest. He is sleeping with support from a pillow on his arm and shoulder discomfort is not waking him up much. He is currently off work until he is able to return to full duty and stated he has to be able to carry a ladder and turn a joystick and steering wheel on heavy machinery.     Pain  Current pain ratin  At best pain ratin  At worst pain ratin  Location: L shoulder  Quality: sharp  Relieving factors: rest  Aggravating factors: lifting and outstretched reach  Progression: improved    Social Support  Lives in: multiple-level home  Lives with: spouse    Hand dominance: right    Diagnostic Tests  MRI studies: abnormal (massive SS tear)    Treatments  Previous treatment: physical therapy  Patient Goals  Patient goals for therapy: decreased pain, increased motion, increased strength, independence with ADLs/IADLs, return to sport/leisure activities and return to work             Objective          Observations     Additional Shoulder Observation Details  Incision sites healing well with no signs of opening or infection    Palpation   Left   No palpable tenderness to the infraspinatus, subscapularis,  supraspinatus and upper trapezius.     Right   No palpable tenderness to the infraspinatus, subscapularis, supraspinatus and upper trapezius.     Tenderness     Left Shoulder   No tenderness in the acromion, biceps tendon (proximal), bicipital groove, coracoid process, infraspinatus tendon and supraspinatus tendon.     Active Range of Motion   Left Shoulder   Flexion: 92 degrees   Abduction: 87 degrees   External rotation 0°: 35 degrees   Internal rotation BTB: L3     Strength/Myotome Testing     Additional Strength Details  Strength nort formally assessed on L shoulder but mm activation appropriate in all planes           Assessment & Plan     Assessment  Impairments: abnormal muscle firing, abnormal or restricted ROM, impaired physical strength, lacks appropriate home exercise program and pain with function  Assessment details: The patient is a familiar 54 yo male who presented to PT with evolving characteristics of L shoulder pain, weakness, and ROM deficits with moderate complexity following a cuff repair and Regeneten support placement performed 6/10/21. He has been doing well since surgery and pain has been minimal. Active OKC elevation was performed to 90 deg with no complaints of pain. Force generation was appropriate in all planes of the shoulder and no TTP was present. He was educated on precautions to minimize risk of a re-tear. He was prescribed an HEP for light cuff activation, scapular retraction, and CKC AROM. He displayed appropriate form with these in the clinic and noted minimal discomfort. I expect the patient to make a timely recovery with skilled PT intervention.     Prognosis: good  Functional Limitations: carrying objects, lifting, reaching behind back, reaching overhead and unable to perform repetitive tasks  Goals  Plan Goals: Short Term Goals (4 weeks):     1. The patient will be independent and compliant with initial HEP.     2. The patient will report pain at rest 0/10 or less and worst  pain 2/10 or less.    3.  L shoulder AROM will improve to flex 130 deg, abd 130 deg, ER 45 deg, IR T12 deg.    4. The patient will demonstrate inc strength evidenced by MMT as follows: flex 4/5, abd 4/5, ER 4/5, and IR 4/5.    5. Quick DASH will improve by 11 points or more.         Long Term Goals (8 weeks):     1. The patient will be appropriate for independent management and compliant with progressed HEP.     2. The patient will report pain at rest 0/10 or less and worst pain 1/10 or less.    3. L shoulder AROM will be within 5 degrees of the contralateral side in flex, abd, ER, and IR.    4. The patient will return to work duties and/or ADLs with no limitations due to shoulder pain or dysfunction.    5. The patient will return to recreational and community activities with no limitations due to shoulder pain or dysfunction.      Plan  Therapy options: will be seen for skilled physical therapy services  Planned modality interventions: cryotherapy, iontophoresis, TENS, electrical stimulation/Russian stimulation and thermotherapy (hydrocollator packs)  Planned therapy interventions: ADL retraining, body mechanics training, flexibility, functional ROM exercises, home exercise program, joint mobilization, manual therapy, neuromuscular re-education, postural training, soft tissue mobilization, strengthening, therapeutic activities and stretching  Frequency: 2x week  Duration in visits: 16  Duration in weeks: 8  Treatment plan discussed with: patient  Plan details: The patient will likely benefit from TE/TA/NMED to improve RC strength, UE proprioception, and scapular mobility. MT will be utilized in addition to stretching for improved GH jt mobility and AROM. Modalities will be used as needed for pain modulation and reduction of swelling. Dry needling as indicated.        Visit Diagnoses:    ICD-10-CM ICD-9-CM   1. Acute pain of left shoulder  M25.512 719.41   2. Weakness  R53.1 780.79   3. Range of motion deficit   M25.60 719.50       Timed:  Manual Therapy:    0     mins  21294;  Therapeutic Exercise:    25     mins  86610;     Neuromuscular Marina:    0    mins  27522;    Therapeutic Activity:     0     mins  12658;     Gait Trainin     mins  55164;     Ultrasound:     0     mins  49877;    Electrical Stimulation:    0     mins  70072 ( );    Untimed:  Electrical Stimulation:    0     mins  11177 ( );  Mechanical Traction:    0     mins  82746;     Timed Treatment:   25   mins   Total Treatment:     45   mins    PT SIGNATURE: Olvin Romero PT         Initial Certification  Certification Period: 2021 thru 10/11/2021  I certify that the therapy services are furnished while this patient is under my care.  The services outlined above are required by this patient, and will be reviewed every 90 days.     PHYSICIAN: Tim Mckay MD      DATE:     Please sign and return via fax to [email or web address] . Thank you, [facility].

## 2021-07-16 ENCOUNTER — TREATMENT (OUTPATIENT)
Dept: PHYSICAL THERAPY | Facility: CLINIC | Age: 56
End: 2021-07-16

## 2021-07-16 DIAGNOSIS — M25.60 RANGE OF MOTION DEFICIT: ICD-10-CM

## 2021-07-16 DIAGNOSIS — R53.1 WEAKNESS: ICD-10-CM

## 2021-07-16 DIAGNOSIS — M25.512 ACUTE PAIN OF LEFT SHOULDER: Primary | ICD-10-CM

## 2021-07-16 PROCEDURE — 97140 MANUAL THERAPY 1/> REGIONS: CPT | Performed by: PHYSICAL THERAPIST

## 2021-07-16 PROCEDURE — 97110 THERAPEUTIC EXERCISES: CPT | Performed by: PHYSICAL THERAPIST

## 2021-07-16 NOTE — PROGRESS NOTES
Physical Therapy Daily Treatment Note      Patient: Doron Henderson   : 1965  Referring practitioner: Tim Mckay MD  Date of Initial Visit: Type: THERAPY  Noted: 2021  Today's Date: 2021  Patient seen for 2 sessions           Subjective Evaluation    History of Present Illness  Mechanism of injury: The pt stated that his shoulder had been sore since beginning his HEP but has continued to experience very little pain. He has been performing his HEP several times per day and has been managing soreness with ice and NSAIDs.    Pain  Current pain ratin  Location: L shoulder           Objective   See Exercise, Manual, and Modality Logs for complete treatment.       Assessment & Plan     Assessment  Assessment details: The pt reported soreness with HEP performance this week and was advised to ice as needed. PROM was improved and he was much less guarded with MT today as compared to his last visit. HE maxed out available ROM with table slides today and TG slides were introduced instead and were tolerated well. Technique with all exercises was good, even with high reps, so his endurance was challenged with exercise prescription but no pain was noted with PT.     Plan  Plan details: Continue MT and AROM exercises.         Visit Diagnoses:    ICD-10-CM ICD-9-CM   1. Acute pain of left shoulder  M25.512 719.41   2. Weakness  R53.1 780.79   3. Range of motion deficit  M25.60 719.50       Progress per Plan of Care           Timed:  Manual Therapy:    15     mins  41460;  Therapeutic Exercise:    33     mins  44538;     Neuromuscular Marina:    0    mins  22884;    Therapeutic Activity:     0     mins  01414;     Gait Trainin     mins  57031;     Ultrasound:     0     mins  25777;    Electrical Stimulation:    0     mins  81069 ( );    Untimed:  Electrical Stimulation:    0     mins  07094 ( );  Mechanical Traction:    0     mins  79757;     Timed Treatment:   48   mins   Total  Treatment:     48   mins  Olvin Romero, PT  Physical Therapist

## 2021-07-19 ENCOUNTER — TREATMENT (OUTPATIENT)
Dept: PHYSICAL THERAPY | Facility: CLINIC | Age: 56
End: 2021-07-19

## 2021-07-19 DIAGNOSIS — M25.512 ACUTE PAIN OF LEFT SHOULDER: Primary | ICD-10-CM

## 2021-07-19 DIAGNOSIS — R53.1 WEAKNESS: ICD-10-CM

## 2021-07-19 DIAGNOSIS — M25.60 RANGE OF MOTION DEFICIT: ICD-10-CM

## 2021-07-19 PROCEDURE — 97140 MANUAL THERAPY 1/> REGIONS: CPT | Performed by: PHYSICAL THERAPIST

## 2021-07-19 PROCEDURE — 97110 THERAPEUTIC EXERCISES: CPT | Performed by: PHYSICAL THERAPIST

## 2021-07-19 NOTE — PROGRESS NOTES
Physical Therapy Daily Treatment Note      Patient: Doron Henderson   : 1965  Referring practitioner: No ref. provider found  Date of Initial Visit: Type: THERAPY  Noted: 2021  Today's Date: 2021  Patient seen for 3 sessions           Subjective Evaluation    History of Present Illness  Mechanism of injury: The pt stated that he was not very sore following his lat PT visit but had an increase in soreness on  with no apparent cause. He noted a few instances of sharp pain with active elevation so he tried to avoid that position. He managed the discomfort with ice and NSAIDs. He has been compliant with his HEP and feels it is going well.    Pain  Current pain ratin  Location: L shoulder           Objective   See Exercise, Manual, and Modality Logs for complete treatment.       Assessment & Plan     Assessment  Assessment details: The pt reported an increase in pain yesterday with no apparent cause and the shoulder was slightly more irritable during treatment today than it was last week. He had some non-painful popping and clicking with MT today and capsular tightness was minimal. PROM was full into elevation and was pain-free. He was reminded of his precautions and was encouraged to d/c any painful activities during the day. Several AIG exercises were performed today and proved fatiguing, with shaking noted in the scapula, so he was encouraged to take rest breaks between sets or when the quality of motion was reduced.    Plan  Plan details: Continue progressions of scapular retraction, AROM, and AIG exercises. MT to reduce pain and maintain motion gains.        Visit Diagnoses:    ICD-10-CM ICD-9-CM   1. Acute pain of left shoulder  M25.512 719.41   2. Weakness  R53.1 780.79   3. Range of motion deficit  M25.60 719.50       Progress per Plan of Care           Timed:  Manual Therapy:    15     mins  20175;  Therapeutic Exercise:    32     mins  07289;     Neuromuscular Marina:    0    mins   08319;    Therapeutic Activity:     0     mins  75349;     Gait Trainin     mins  57330;     Ultrasound:     0     mins  84940;    Electrical Stimulation:    0     mins  14555 ( );    Untimed:  Electrical Stimulation:    0     mins  50004 ( );  Mechanical Traction:    0     mins  71089;     Timed Treatment:   47   mins   Total Treatment:     55   mins  Olvin Romero PT  Physical Therapist

## 2021-07-23 ENCOUNTER — TREATMENT (OUTPATIENT)
Dept: PHYSICAL THERAPY | Facility: CLINIC | Age: 56
End: 2021-07-23

## 2021-07-23 DIAGNOSIS — M25.512 ACUTE PAIN OF LEFT SHOULDER: Primary | ICD-10-CM

## 2021-07-23 DIAGNOSIS — M25.60 RANGE OF MOTION DEFICIT: ICD-10-CM

## 2021-07-23 DIAGNOSIS — R53.1 WEAKNESS: ICD-10-CM

## 2021-07-23 PROCEDURE — 97110 THERAPEUTIC EXERCISES: CPT | Performed by: PHYSICAL THERAPIST

## 2021-07-23 PROCEDURE — 97140 MANUAL THERAPY 1/> REGIONS: CPT | Performed by: PHYSICAL THERAPIST

## 2021-07-23 NOTE — PROGRESS NOTES
Physical Therapy Daily Treatment Note      Patient: Doron Henderson   : 1965  Referring practitioner: Tim Mckay MD  Date of Initial Visit: Type: THERAPY  Noted: 2021  Today's Date: 2021  Patient seen for 4 sessions           Subjective Evaluation    History of Present Illness  Mechanism of injury: The pt stated that his shoulder pain has remained minimal and he has only been feeling tight in the mornings. He continues to report fatigue with AIG exercises but feels he is getting stronger. He has returned to basic household chores such as washing dishes and sweeping the floors, primarily using his R UE.     Pain  Current pain ratin  Location: L shoulder           Objective          Passive Range of Motion   Left Shoulder   Flexion: 165 degrees   Abduction: 165 degrees   External rotation 0°: 60 degrees   Internal rotation 0°: WFL      See Exercise, Manual, and Modality Logs for complete treatment.       Assessment & Plan     Assessment  Assessment details: The pt is doing well very given his time-frame since surgery and now has full PROM in all planes of the L shoulder and no pain with movement. He continues to display fatigue with AIG and scapular retraction exercises but this is already improved from last week. He was challenged with wall slides today towards the end of treatment and displayed appropriate performance, though he became shaky at end-range. At this time progressions are limited by post-op restrictions and he may benefit from increased independence for a couple of weeks.     Plan  Plan details: Continue progression of AROM exercises and scapular retraction as tolerated. MT to preserve ROM and joint mobility.         Visit Diagnoses:    ICD-10-CM ICD-9-CM   1. Acute pain of left shoulder  M25.512 719.41   2. Weakness  R53.1 780.79   3. Range of motion deficit  M25.60 719.50       Progress per Plan of Care           Timed:  Manual Therapy:    15     mins   27472;  Therapeutic Exercise:    40     mins  42712;     Neuromuscular Marina:    0    mins  88772;    Therapeutic Activity:     0     mins  54881;     Gait Trainin     mins  36104;     Ultrasound:     0     mins  73980;    Electrical Stimulation:    0     mins  20316 ( );    Untimed:  Electrical Stimulation:    0     mins  76897 ( );  Mechanical Traction:    0     mins  22151;     Timed Treatment:   55   mins   Total Treatment:     55   mins  Olvin Romero PT  Physical Therapist

## 2021-07-26 ENCOUNTER — TREATMENT (OUTPATIENT)
Dept: PHYSICAL THERAPY | Facility: CLINIC | Age: 56
End: 2021-07-26

## 2021-07-26 DIAGNOSIS — R53.1 WEAKNESS: ICD-10-CM

## 2021-07-26 DIAGNOSIS — M25.60 RANGE OF MOTION DEFICIT: ICD-10-CM

## 2021-07-26 DIAGNOSIS — M25.512 ACUTE PAIN OF LEFT SHOULDER: Primary | ICD-10-CM

## 2021-07-26 PROCEDURE — 97140 MANUAL THERAPY 1/> REGIONS: CPT | Performed by: PHYSICAL THERAPIST

## 2021-07-26 PROCEDURE — 97110 THERAPEUTIC EXERCISES: CPT | Performed by: PHYSICAL THERAPIST

## 2021-07-26 NOTE — PROGRESS NOTES
Physical Therapy Daily Treatment Note      Patient: Doron Henderson   : 1965  Referring practitioner: Tim Mckay MD  Date of Initial Visit: Type: THERAPY  Noted: 2021  Today's Date: 2021  Patient seen for 5 sessions           Subjective Evaluation    History of Present Illness  Mechanism of injury: The pt stated that his shoulder was pain-free over the weekend. He has been compliant with his HEP and feels all of the exercises are easy. He continues to notice some fatigue in the shoulder with AIGs.     Pain  Current pain ratin  Location: L shoulder           Objective   See Exercise, Manual, and Modality Logs for complete treatment.       Assessment & Plan     Assessment  Assessment details: The pt is doing very well S/P cuff repair and has full PROM in all planes and no pain. He is not being challenged much with his HEP but was encouraged to perform higher reps to challenge endurance while staying within precautions. Wall slides were added to his HEP today with no resistance and these were tolerated well with no complaints of pain, though fatigue was noted much quicker and was evident by shakiness in the scapula. AIG progressions were continued and were added to his HEP as well. Because he is doing so well his visit frequency will be reduced to 1x/week.    Plan  Plan details: Continue MT and progressions of scapular retraction and cuff activation exercises as allowed by post-op protocol.         Visit Diagnoses:    ICD-10-CM ICD-9-CM   1. Acute pain of left shoulder  M25.512 719.41   2. Weakness  R53.1 780.79   3. Range of motion deficit  M25.60 719.50       Progress per Plan of Care           Timed:  Manual Therapy:    15     mins  99379;  Therapeutic Exercise:    30     mins  41981;     Neuromuscular Marina:    0    mins  23721;    Therapeutic Activity:     0     mins  41834;     Gait Trainin     mins  73192;     Ultrasound:     0     mins  50076;    Electrical  Stimulation:    0     mins  07052 ( );    Untimed:  Electrical Stimulation:    0     mins  35458 ( );  Mechanical Traction:    0     mins  99659;     Timed Treatment:   45   mins   Total Treatment:     45   mins  Olvin Romero PT  Physical Therapist

## 2021-08-02 ENCOUNTER — TREATMENT (OUTPATIENT)
Dept: PHYSICAL THERAPY | Facility: CLINIC | Age: 56
End: 2021-08-02

## 2021-08-02 DIAGNOSIS — M25.512 ACUTE PAIN OF LEFT SHOULDER: Primary | ICD-10-CM

## 2021-08-02 DIAGNOSIS — M25.60 RANGE OF MOTION DEFICIT: ICD-10-CM

## 2021-08-02 DIAGNOSIS — R53.1 WEAKNESS: ICD-10-CM

## 2021-08-02 PROCEDURE — 97140 MANUAL THERAPY 1/> REGIONS: CPT | Performed by: PHYSICAL THERAPIST

## 2021-08-02 PROCEDURE — 97110 THERAPEUTIC EXERCISES: CPT | Performed by: PHYSICAL THERAPIST

## 2021-08-02 NOTE — PROGRESS NOTES
Physical Therapy Daily Treatment Note      Patient: Doron Henderson   : 1965  Referring practitioner: Tim Mckay MD  Date of Initial Visit: Type: THERAPY  Noted: 2021  Today's Date: 2021  Patient seen for 6 sessions           Subjective Evaluation    History of Present Illness  Mechanism of injury: The pt stated that his shoulder has felt tight but otherwise he denied discomfort or pain this week. He has remained compliant with his HEP and feels shakiness with AIGs and wall slides has decreased.     Pain  Current pain ratin  Location: L shoulder           Objective   See Exercise, Manual, and Modality Logs for complete treatment.       Assessment & Plan     Assessment  Assessment details: The pt continues to progress very quickly and physiological healing is halting rehab more so than tolerance or function. He has full PROM and his AROM is progressing nicely. Some shakiness of the scapula was noted at end-range wall slides but it was mild and I anticipate this will improve with ongoing performance. He is eager to progress but was educated on time frames for healing that dictate safe exercise progressions. His HEP was modified to include scaption wall slides and CKC wall circles.    Plan  Plan details: Continue progressions of scapular retraction exercises, AROM, and stretching.         Visit Diagnoses:    ICD-10-CM ICD-9-CM   1. Acute pain of left shoulder  M25.512 719.41   2. Weakness  R53.1 780.79   3. Range of motion deficit  M25.60 719.50       Progress per Plan of Care           Timed:  Manual Therapy:    13     mins  25422;  Therapeutic Exercise:    30     mins  28797;     Neuromuscular Marina:    0    mins  59963;    Therapeutic Activity:     0     mins  23607;     Gait Trainin     mins  37130;     Ultrasound:     0     mins  36819;    Electrical Stimulation:    0     mins  83208 ( );    Untimed:  Electrical Stimulation:    0     mins  07581 (  );  Mechanical Traction:    0     mins  93014;     Timed Treatment:   43   mins   Total Treatment:     43   mins  Olvin Romero PT  Physical Therapist

## 2021-08-09 ENCOUNTER — OFFICE VISIT (OUTPATIENT)
Dept: ORTHOPEDIC SURGERY | Facility: CLINIC | Age: 56
End: 2021-08-09

## 2021-08-09 DIAGNOSIS — Z98.890 S/P LEFT ROTATOR CUFF REPAIR: Primary | ICD-10-CM

## 2021-08-09 DIAGNOSIS — Z02.6 ENCOUNTER RELATED TO WORKER'S COMPENSATION CLAIM: ICD-10-CM

## 2021-08-09 PROCEDURE — 99024 POSTOP FOLLOW-UP VISIT: CPT | Performed by: ORTHOPAEDIC SURGERY

## 2021-08-09 NOTE — PROGRESS NOTES
Chief Complaint   Patient presents with   • Post-op     1 month follow up; 8 weeks s/p (L) shoulder arthroscopy with RCR repair, using double row repair with Smith and Nephew anchors and Regeneten Collagen implant 06/10/2021            HPI  He is doing well without complaints.      There were no vitals filed for this visit.      Physical Exam:  His range of motion looks better than most at this point.  Neurologically intact        ICD-10-CM ICD-9-CM   1. S/P left rotator cuff repair  Z98.890 V45.89   2. Encounter related to worker's compensation claim  Z02.6 V70.3       Orders Placed This Encounter   Procedures   • Ambulatory Referral to Physical Therapy Evaluate and treat, Ortho       He will continue physical therapy at [facility].  Follow-up in 1 month.  Work restrictions are no use left arm.      Tim Mckay M.D., [facility]  Orthopedic Surgeon  Fellow[employer] Trained Sports Medicine  [facility]  [address] [place] [address], [address]  [place] [address]      EMR Dragon/Transcription disclaimer:  Much of this encounter note is an electronic transcription of spoken language to printed text. Electronic transcription of spoken language may permit erroneous, or at times, nonsensical words or phrases to be inadvertently transcribed. Although I have reviewed the note for such errors, some may still exist.

## 2021-08-10 ENCOUNTER — TREATMENT (OUTPATIENT)
Dept: PHYSICAL THERAPY | Facility: CLINIC | Age: 56
End: 2021-08-10

## 2021-08-10 DIAGNOSIS — M25.60 RANGE OF MOTION DEFICIT: ICD-10-CM

## 2021-08-10 DIAGNOSIS — R53.1 WEAKNESS: ICD-10-CM

## 2021-08-10 DIAGNOSIS — M25.512 ACUTE PAIN OF LEFT SHOULDER: Primary | ICD-10-CM

## 2021-08-10 PROCEDURE — 97140 MANUAL THERAPY 1/> REGIONS: CPT | Performed by: PHYSICAL THERAPIST

## 2021-08-10 PROCEDURE — 97110 THERAPEUTIC EXERCISES: CPT | Performed by: PHYSICAL THERAPIST

## 2021-08-10 NOTE — PROGRESS NOTES
Re-Assessment / Re-Certification      Patient: Doron Henderson   : 1965  Diagnosis/ICD-10 Code:  Acute pain of left shoulder [M25.512]  Referring practitioner: Tim Mckay MD  Date of Initial Visit: Type: THERAPY  Noted: 2021  Today's Date: 8/10/2021  Patient seen for 7 sessions      Subjective:     Subjective Questionnaire: QuickDASH: 27  Clinical Progress: improved  Home Program Compliance: Yes  Treatment has included: therapeutic exercise, neuromuscular re-education, manual therapy and therapeutic activity    Subjective   Objective          Observations     Additional Shoulder Observation Details  Incision sites healing well with no signs of opening or infection    Palpation   Left   No palpable tenderness to the infraspinatus, subscapularis, supraspinatus and upper trapezius.     Right   No palpable tenderness to the infraspinatus, subscapularis, supraspinatus and upper trapezius.     Tenderness     Left Shoulder   No tenderness in the acromion, biceps tendon (proximal), bicipital groove, coracoid process, infraspinatus tendon and supraspinatus tendon.     Active Range of Motion   Left Shoulder   Flexion: 162 degrees   Abduction: 152 degrees   External rotation 0°: 35 degrees   Internal rotation BTB: T7     Strength/Myotome Testing     Left Shoulder     Planes of Motion   Flexion: 4-   Abduction: 4   External rotation at 0°: 3   Internal rotation at 0°: 4+     Right Shoulder     Planes of Motion   Flexion: 5   Abduction: 5   External rotation at 0°: 5   Internal rotation at 0°: 5       Assessment & Plan     Assessment  Impairments: abnormal muscle firing, abnormal or restricted ROM, impaired physical strength, lacks appropriate home exercise program and pain with function  Assessment details: The pt has made very quick progress with ROM following his cuff repair and now has full PROM and functional AROM. He still reports discomfort at end-ranges and has shakiness of the UE with elevation but  I anticipate that this will improve with increased strength as he continues to rehab. GH jt mobility is WFL and some popping and clicking is noted with manual therapy. OKC AROM exercises were introduced today through all planes and proved much more fatiguing than CKC activities. Fatigue resulted in gross UE shakiness and he was encouraged to rest and reset as needed. A mirror was used for visual feedback and he was able to correct scapular malpositioning as needed. Overall, he is doing very well and will continue to require patience as his activity levels remain restricted by post-op protocol.   Prognosis: good  Functional Limitations: carrying objects, lifting, reaching behind back, reaching overhead and unable to perform repetitive tasks  Goals  Plan Goals: Short Term Goals (4 weeks):     1. The patient will be independent and compliant with initial HEP. Met    2. The patient will report pain at rest 0/10 or less and worst pain 2/10 or less. Met    3.  L shoulder AROM will improve to flex 130 deg, abd 130 deg, ER 45 deg, IR T12 deg. Met    4. The patient will demonstrate inc strength evidenced by MMT as follows: flex 4/5, abd 4/5, ER 4/5, and IR 4/5. Ongoing      5. Quick DASH will improve by 11 points or more. Met        Long Term Goals (8 weeks):     1. The patient will be appropriate for independent management and compliant with progressed HEP. Ongoing     2. The patient will report pain at rest 0/10 or less and worst pain 1/10 or less. Met    3. L shoulder AROM will be within 5 degrees of the contralateral side in flex, abd, ER, and IR. Ongoing     4. The patient will return to work duties and/or ADLs with no limitations due to shoulder pain or dysfunction. Ongoing    5. The patient will return to recreational and community activities with no limitations due to shoulder pain or dysfunction. Ongoing      Plan  Therapy options: will be seen for skilled physical therapy services  Planned modality interventions:  cryotherapy, iontophoresis, TENS, electrical stimulation/Russian stimulation and thermotherapy (hydrocollator packs)  Planned therapy interventions: ADL retraining, body mechanics training, flexibility, functional ROM exercises, home exercise program, joint mobilization, manual therapy, neuromuscular re-education, postural training, soft tissue mobilization, strengthening, therapeutic activities and stretching  Frequency: 2x week  Duration in visits: 16  Duration in weeks: 8  Treatment plan discussed with: patient  Plan details: Continue progressions of AROM exercises, scapular retraction, and cuff activation.        Visit Diagnoses:    ICD-10-CM ICD-9-CM   1. Acute pain of left shoulder  M25.512 719.41   2. Weakness  R53.1 780.79   3. Range of motion deficit  M25.60 719.50       Progress toward previous goals: Partially Met    PT Signature: Olvin Romero PT      Based upon review of the patient's progress and continued therapy plan, it is my medical opinion that Doron Henderson should continue physical therapy treatment at [facility] PHYSICAL THERAPY  [facility] [address] [phone].    Signature: __________________________________  Tim Mckay MD    Timed:  Manual Therapy:    15     mins  43930;  Therapeutic Exercise:    28     mins  71340;     Neuromuscular Marina:    0    mins  41503;    Therapeutic Activity:     0     mins  13572;     Gait Trainin     mins  75383;     Ultrasound:     0     mins  98284;    Electrical Stimulation:    0     mins  47990 ( );    Untimed:  Electrical Stimulation:    0     mins  81026 ( );  Mechanical Traction:    0     mins  23549;     Timed Treatment:   43   mins   Total Treatment:     43   mins

## 2021-08-16 ENCOUNTER — TREATMENT (OUTPATIENT)
Dept: PHYSICAL THERAPY | Facility: CLINIC | Age: 56
End: 2021-08-16

## 2021-08-16 DIAGNOSIS — M25.512 ACUTE PAIN OF LEFT SHOULDER: Primary | ICD-10-CM

## 2021-08-16 DIAGNOSIS — M25.60 RANGE OF MOTION DEFICIT: ICD-10-CM

## 2021-08-16 DIAGNOSIS — R53.1 WEAKNESS: ICD-10-CM

## 2021-08-16 PROCEDURE — 97140 MANUAL THERAPY 1/> REGIONS: CPT | Performed by: PHYSICAL THERAPIST

## 2021-08-16 PROCEDURE — 97110 THERAPEUTIC EXERCISES: CPT | Performed by: PHYSICAL THERAPIST

## 2021-08-16 NOTE — PROGRESS NOTES
Physical Therapy Daily Treatment Note      Patient: Doron Henderson   : 1965  Referring practitioner: Tim Mckay MD  Date of Initial Visit: Type: THERAPY  Noted: 2021  Today's Date: 2021  Patient seen for 8 sessions           Subjective Evaluation    History of Present Illness  Mechanism of injury: The pt stated that the addition of OKC AROM exercises were challenging and noted brief sharp pain on a couple of occasions with OKC. When pain was present he was able to rest and reset with no recurring symptoms. He has not had any pain otherwise.     Pain  Current pain ratin  Location: L shoulder           Objective   See Exercise, Manual, and Modality Logs for complete treatment.       Assessment & Plan     Assessment  Assessment details: OKC AROM exercises were introduced last week and were added to his HEP and he reported a few instances of sharp pain with performance at home. He was reminded not to push through pain and to d/c exercise as needed. He was also shown how to decrease the lever arm by bending his elbow to reduce stress at the shoulder if he felt the exercises were too challenging. He was able to perform AROM past 90 deg elevation with a fully extended arm with no pain today, but shakiness was noted with ongoing reps. SL ER proved too challenging so it was modified to a gravity-eliminated position. The pecs were hypertonic today but he responded well to STM.    Plan  Plan details: Continue AROM exercises. Consider cane flexion.        Visit Diagnoses:    ICD-10-CM ICD-9-CM   1. Acute pain of left shoulder  M25.512 719.41   2. Weakness  R53.1 780.79   3. Range of motion deficit  M25.60 719.50       Progress per Plan of Care           Timed:  Manual Therapy:    12     mins  22014;  Therapeutic Exercise:    37     mins  94204;     Neuromuscular Marina:    0    mins  43069;    Therapeutic Activity:     0     mins  80992;     Gait Trainin     mins  15880;      Ultrasound:     0     mins  08506;    Electrical Stimulation:    0     mins  04814 ( );    Untimed:  Electrical Stimulation:    0     mins  38599 ( );  Mechanical Traction:    0     mins  72873;     Timed Treatment:   49   mins   Total Treatment:     49   mins  Olvin Romero PT  Physical Therapist

## 2021-08-23 ENCOUNTER — TREATMENT (OUTPATIENT)
Dept: PHYSICAL THERAPY | Facility: CLINIC | Age: 56
End: 2021-08-23

## 2021-08-23 DIAGNOSIS — M25.60 RANGE OF MOTION DEFICIT: ICD-10-CM

## 2021-08-23 DIAGNOSIS — R53.1 WEAKNESS: ICD-10-CM

## 2021-08-23 DIAGNOSIS — M25.512 ACUTE PAIN OF LEFT SHOULDER: Primary | ICD-10-CM

## 2021-08-23 PROCEDURE — 97140 MANUAL THERAPY 1/> REGIONS: CPT | Performed by: PHYSICAL THERAPIST

## 2021-08-23 PROCEDURE — 97110 THERAPEUTIC EXERCISES: CPT | Performed by: PHYSICAL THERAPIST

## 2021-08-23 NOTE — PROGRESS NOTES
"   Physical Therapy Daily Treatment Note      Patient: Doron Henderson   : 1965  Referring practitioner: Tim Mckay MD  Date of Initial Visit: Type: THERAPY  Noted: 2021  Today's Date: 2021  Patient seen for 9 sessions           Subjective Evaluation    History of Present Illness  Mechanism of injury: The pt stated that his shoulder pain has been minimal this week and reported that his exercises have gotten easier. He has been modifying OKC flexion activities by bending the elbow when he gets tired but has not had to do this as often. He stated that his shoulder felt tight this morning.     Pain  Current pain ratin  Location: L shoulder           Objective   See Exercise, Manual, and Modality Logs for complete treatment.       Assessment & Plan     Assessment  Assessment details: The pt reported tightness in the shoulder upon presentation but no capsular tightness was noted with MT and ROM remained full. His OKC flexion exercise has gotten easier so reps were progressed, scaption and abduction were added, and he was encouraged to move through full ROM. He reported no pain with the exercises but became visibly fatigued in his last set of abductions and was asked to rest due to compensations. He displayed improved control of ER during \"no money\" exercises and was advised to continue gravity eliminated ER at home until endurance improved. He continues to recover very quickly and was reminded of his precautions.    Plan  Plan details: Continue OKC AROM exercises and scapular strengthening.        Visit Diagnoses:    ICD-10-CM ICD-9-CM   1. Acute pain of left shoulder  M25.512 719.41   2. Weakness  R53.1 780.79   3. Range of motion deficit  M25.60 719.50       Progress per Plan of Care           Timed:  Manual Therapy:    15     mins  73045;  Therapeutic Exercise:    38     mins  56116;     Neuromuscular Marina:    0    mins  50101;    Therapeutic Activity:     0     mins  31129;     Gait " Trainin     mins  01972;     Ultrasound:     0     mins  38895;    Electrical Stimulation:    0     mins  99484 ( );    Untimed:  Electrical Stimulation:    0     mins  21733 ( );  Mechanical Traction:    0     mins  74784;     Timed Treatment:   53   mins   Total Treatment:     53   mins  Olvin Romero PT  Physical Therapist

## 2021-08-30 ENCOUNTER — TREATMENT (OUTPATIENT)
Dept: PHYSICAL THERAPY | Facility: CLINIC | Age: 56
End: 2021-08-30

## 2021-08-30 DIAGNOSIS — M25.512 ACUTE PAIN OF LEFT SHOULDER: Primary | ICD-10-CM

## 2021-08-30 DIAGNOSIS — R53.1 WEAKNESS: ICD-10-CM

## 2021-08-30 DIAGNOSIS — M25.60 RANGE OF MOTION DEFICIT: ICD-10-CM

## 2021-08-30 PROCEDURE — 97140 MANUAL THERAPY 1/> REGIONS: CPT | Performed by: PHYSICAL THERAPIST

## 2021-08-30 PROCEDURE — 97110 THERAPEUTIC EXERCISES: CPT | Performed by: PHYSICAL THERAPIST

## 2021-08-30 NOTE — PROGRESS NOTES
Physical Therapy Daily Treatment Note      Patient: Doron Henderson   : 1965  Referring practitioner: Tim Mckay MD  Date of Initial Visit: Type: THERAPY  Noted: 2021  Today's Date: 2021  Patient seen for 10 sessions           Subjective Evaluation    History of Present Illness  Mechanism of injury: The pt stated that his shoulder has been pain-free since his last visit but he continues to report tightness. He has been compliant with his HEP and feels the exercises are getting easier, although shakiness with high reps has persisted.    Pain  Current pain ratin  Location: L shoulder           Objective   See Exercise, Manual, and Modality Logs for complete treatment.       Assessment & Plan     Assessment  Assessment details: The pt continues to rehab quickly and has not had any pain for the last several weeks. ROM into elevation and IR is full and ER deficits are largely attributable to ongoing mm weakness. Capsular mobility is WFL and MT will likely only be needed sparingly moving forward. He will be 12+ weeks S/P cuff repair this week so light strengthening exercises were introduced today but were not yet added to his HEP. He continued to display shakiness of the UE with high rep OKC flexion and abduction and required cuing to stop and rest when form was compromised. His HEP was modified to include OKC abduction and resisted wall slides.     Plan  Plan details: The pt will continue PT with another therapist in our office due to current clinician changing locations.         Visit Diagnoses:    ICD-10-CM ICD-9-CM   1. Acute pain of left shoulder  M25.512 719.41   2. Weakness  R53.1 780.79   3. Range of motion deficit  M25.60 719.50       Progress per Plan of Care           Timed:  Manual Therapy:    10     mins  85398;  Therapeutic Exercise:    35     mins  63425;     Neuromuscular Marina:    0    mins  93311;    Therapeutic Activity:     0     mins  86466;     Gait Trainin      mins  40865;     Ultrasound:     0     mins  08035;    Electrical Stimulation:    0     mins  95685 ( );    Untimed:  Electrical Stimulation:    0     mins  17899 ( );  Mechanical Traction:    0     mins  43762;     Timed Treatment:   45   mins   Total Treatment:     45   mins  Olvin Romero PT  Physical Therapist

## 2021-09-07 ENCOUNTER — TREATMENT (OUTPATIENT)
Dept: PHYSICAL THERAPY | Facility: CLINIC | Age: 56
End: 2021-09-07

## 2021-09-07 DIAGNOSIS — R53.1 WEAKNESS: ICD-10-CM

## 2021-09-07 DIAGNOSIS — Z98.890 STATUS POST LEFT ROTATOR CUFF REPAIR: ICD-10-CM

## 2021-09-07 DIAGNOSIS — M25.60 RANGE OF MOTION DEFICIT: ICD-10-CM

## 2021-09-07 DIAGNOSIS — M25.512 ACUTE PAIN OF LEFT SHOULDER: Primary | ICD-10-CM

## 2021-09-07 PROCEDURE — 97530 THERAPEUTIC ACTIVITIES: CPT | Performed by: PHYSICAL THERAPIST

## 2021-09-07 PROCEDURE — 97140 MANUAL THERAPY 1/> REGIONS: CPT | Performed by: PHYSICAL THERAPIST

## 2021-09-07 PROCEDURE — 97110 THERAPEUTIC EXERCISES: CPT | Performed by: PHYSICAL THERAPIST

## 2021-09-07 NOTE — PROGRESS NOTES
Physical Therapy Daily Progress Note    Patient Information  Doron Henderson  1965      Visit # : 11    Doron Henderson reports 0/10 pain today at rest.  Pt reports he is not back to work yet.      L shoulder was painful a couple days ago.  Return to MD next Monday.     Objective Pt presents to PT today with no distress noted.    Palpation:  L UT and anterior shoulder.    AROM:  L shoulder ER 73 degrees,             IR 60     MMT: L shoulder flexion 3+/5 above 90 degrees.     Functional lifting:  Pt is still unable to perform many functional lifting activity due to precautions.        See Exercise, Manual, and Modality Logs for complete treatment.     Assessment/Plan  Pt with improved overall status.  He is pain free at rest.  He has improved ROM and strength.  We are just now able to perform strength activity with less precautions.  Functional lifting is just now being able to be performed.      New goals: 6 weeks.      1. Pt will be able to climb vertical ladder without pain in the shoulder.  2.  Pt will be able to perform functional box lift to chest height of 40#  3.  Pt will have 4/5 strength in the L UE above 90 degrees.       Progress per Plan of Care and Progress strengthening /stabilization /functional activity  Pt to be seen 2 x week for 6 weeks.     Visit Diagnoses:    ICD-10-CM ICD-9-CM   1. Acute pain of left shoulder  M25.512 719.41   2. Weakness  R53.1 780.79   3. Range of motion deficit  M25.60 719.50            Manual Therapy:     8    mins  94238;  Therapeutic Exercise:    24     mins  73379;     Neuromuscular Marina:        mins  30687;    Therapeutic Activity:     12     mins  11535;     Gait Training:           mins  01842;     Ultrasound:          mins  91217;    Electrical Stimulation:         mins  46872 ( );  Dry Needling          mins self-pay    Timed Treatment:   44   mins   Total Treatment:     44   mins          Shamir Marley, PT  Physical Therapist

## 2021-09-09 ENCOUNTER — TREATMENT (OUTPATIENT)
Dept: PHYSICAL THERAPY | Facility: CLINIC | Age: 56
End: 2021-09-09

## 2021-09-09 DIAGNOSIS — R53.1 WEAKNESS: ICD-10-CM

## 2021-09-09 DIAGNOSIS — M25.60 RANGE OF MOTION DEFICIT: ICD-10-CM

## 2021-09-09 DIAGNOSIS — Z98.890 STATUS POST LEFT ROTATOR CUFF REPAIR: Primary | ICD-10-CM

## 2021-09-09 PROCEDURE — 97140 MANUAL THERAPY 1/> REGIONS: CPT | Performed by: PHYSICAL THERAPIST

## 2021-09-09 PROCEDURE — 97530 THERAPEUTIC ACTIVITIES: CPT | Performed by: PHYSICAL THERAPIST

## 2021-09-09 PROCEDURE — 97110 THERAPEUTIC EXERCISES: CPT | Performed by: PHYSICAL THERAPIST

## 2021-09-09 NOTE — PROGRESS NOTES
Physical Therapy Daily Progress Note    Patient Information  Doron Henderson  1965      Visit # : 12    Doron Henderson reports 0/10 pain today at rest.  Pt reports he was tired and felt weak after the exercises.     Objective Pt presents to PT today with no distress noted.     AROM:  L shoulder flexion 175 degrees,  ER 80 degrees, IR 70 degrees.     Pt with more weakness in ER than with flexion     See Exercise, Manual, and Modality Logs for complete treatment.     Assessment/Plan  Pt has no pain and really good ROM.  The strength training is going well and is pain free.  He just has some obvious weakness and endurance issues.       Progress per Plan of Care  Check response to treatment.     Visit Diagnoses:    ICD-10-CM ICD-9-CM   1. Status post left rotator cuff repair  Z98.890 V45.89   2. Range of motion deficit  M25.60 719.50   3. Weakness  R53.1 780.79            Manual Therapy:    8     mins  28353;  Therapeutic Exercise:    35     mins  10116;     Neuromuscular Marina:        mins  12319;    Therapeutic Activity:     13     mins  65865;     Gait Training:           mins  25750;     Ultrasound:          mins  58464;    Electrical Stimulation:         mins  71580 ( );  Dry Needling          mins self-pay    Timed Treatment:   56   mins   Total Treatment:     56   mins          Shamir Marley, PT  Physical Therapist

## 2021-09-13 ENCOUNTER — OFFICE VISIT (OUTPATIENT)
Dept: ORTHOPEDIC SURGERY | Facility: CLINIC | Age: 56
End: 2021-09-13

## 2021-09-13 DIAGNOSIS — Z98.890 S/P LEFT ROTATOR CUFF REPAIR: Primary | ICD-10-CM

## 2021-09-13 DIAGNOSIS — Z02.6 ENCOUNTER RELATED TO WORKER'S COMPENSATION CLAIM: ICD-10-CM

## 2021-09-13 PROCEDURE — 99213 OFFICE O/P EST LOW 20 MIN: CPT | Performed by: ORTHOPAEDIC SURGERY

## 2021-09-13 NOTE — PROGRESS NOTES
Chief Complaint   Patient presents with   • Post-op     1 month follow up; 3 months s/p (L) shoulder arthroscopy with RCR repair, using double row repair with Smith and Nephew anchors and Regeneten Collagen implant 06/10/2021            HPI    He is doing well.  He started strengthening last week.    There were no vitals filed for this visit.      Physical Exam:  Left shoulder is full motion.  Rotator cuff strength 4+ out of 5      ICD-10-CM ICD-9-CM   1. S/P left rotator cuff repair  Z98.890 V45.89   2. Encounter related to worker's compensation claim  Z02.6 V70.3       Orders Placed This Encounter   Procedures   • Ambulatory Referral to Physical Therapy Evaluate and treat, Ortho     He will continue physical therapy.  I have ordered more for strengthening.  Work restrictions are no lifting left arm and no climbing.  I will see him back in a month        Tim Mckay M.D., FAAOS  Orthopedic Surgeon  Fellowship Trained Sports Medicine  [facility]  Orthopedics and Sports Medicine  [address], [address]  [place] [address]      EMR Dragon/Transcription disclaimer:  Much of this encounter note is an electronic transcription of spoken language to printed text. Electronic transcription of spoken language may permit erroneous, or at times, nonsensical words or phrases to be inadvertently transcribed. Although I have reviewed the note for such errors, some may still exist.

## 2021-09-14 ENCOUNTER — TREATMENT (OUTPATIENT)
Dept: PHYSICAL THERAPY | Facility: CLINIC | Age: 56
End: 2021-09-14

## 2021-09-14 DIAGNOSIS — Z98.890 STATUS POST LEFT ROTATOR CUFF REPAIR: Primary | ICD-10-CM

## 2021-09-14 DIAGNOSIS — R53.1 WEAKNESS: ICD-10-CM

## 2021-09-14 PROCEDURE — 97530 THERAPEUTIC ACTIVITIES: CPT | Performed by: PHYSICAL THERAPIST

## 2021-09-14 PROCEDURE — 97110 THERAPEUTIC EXERCISES: CPT | Performed by: PHYSICAL THERAPIST

## 2021-09-14 NOTE — PROGRESS NOTES
Physical Therapy Daily Progress Note    Patient Information  Doron Henderson  1965      Visit # : 13    Doron Henderson reports 0/10 pain today at rest.  Soreness and stiffness occasional.  Soreness after last PT session from doing new motions.         Objective Pt presents to PT today with no distress noted.     AROM:  L shoulder ER 86 degrees,  Flexion 174 degrees.     Pt with L shoulder fatigue noted with activity.      ER at 90 degrees abd was the most noted to fatigue quickly.     See Exercise, Manual, and Modality Logs for complete treatment.     Assessment/Plan  Pt with increased ROM.  His strength and function are improving slowly.       Progress per Plan of Care and Progress strengthening /stabilization /functional activity      Visit Diagnoses:    ICD-10-CM ICD-9-CM   1. Status post left rotator cuff repair  Z98.890 V45.89   2. Weakness  R53.1 780.79            Manual Therapy:         mins  40097;  Therapeutic Exercise:    42     mins  18631;     Neuromuscular Marina:        mins  05859;    Therapeutic Activity:     11     mins  11289;     Gait Training:           mins  22634;     Ultrasound:          mins  33028;    Electrical Stimulation:         mins  86196 ( );  Dry Needling          mins self-pay    Timed Treatment:   53   mins   Total Treatment:     53   mins          Shamir Marley, PT  Physical Therapist

## 2021-09-16 ENCOUNTER — TREATMENT (OUTPATIENT)
Dept: PHYSICAL THERAPY | Facility: CLINIC | Age: 56
End: 2021-09-16

## 2021-09-16 DIAGNOSIS — Z98.890 STATUS POST LEFT ROTATOR CUFF REPAIR: Primary | ICD-10-CM

## 2021-09-16 DIAGNOSIS — R53.1 WEAKNESS: ICD-10-CM

## 2021-09-16 DIAGNOSIS — M25.60 RANGE OF MOTION DEFICIT: ICD-10-CM

## 2021-09-16 PROCEDURE — 97140 MANUAL THERAPY 1/> REGIONS: CPT | Performed by: PHYSICAL THERAPIST

## 2021-09-16 PROCEDURE — 97530 THERAPEUTIC ACTIVITIES: CPT | Performed by: PHYSICAL THERAPIST

## 2021-09-16 PROCEDURE — 97110 THERAPEUTIC EXERCISES: CPT | Performed by: PHYSICAL THERAPIST

## 2021-09-16 NOTE — PROGRESS NOTES
Physical Therapy Daily Progress Note    Patient Information  Doron Henderson  1965      Visit # : 14    Doron Henderson reports 0/10 pain today at rest.  Tightness noted in the anterior L shoulder.         Objective Pt presents to PT today with no distress noted.     Pt with less fatigue noted with HEP.     Pt is having pain in the bicep tendon proximately with OH activity once fatigued.      PROM:  L shoulder ER 88 degrees,      See Exercise, Manual, and Modality Logs for complete treatment.     Assessment/Plan  Pt is moving much better and having less pain noted. He is having some irritation at the bicep tendon anteriorly.       Progress per Plan of Care and Progress strengthening /stabilization /functional activity      Visit Diagnoses:    ICD-10-CM ICD-9-CM   1. Status post left rotator cuff repair  Z98.890 V45.89   2. Weakness  R53.1 780.79   3. Range of motion deficit  M25.60 719.50            Manual Therapy:    9     mins  96013;  Therapeutic Exercise:    34     mins  76846;     Neuromuscular Marina:        mins  84204;    Therapeutic Activity:     12     mins  61133;     Gait Training:           mins  14691;     Ultrasound:          mins  08838;    Electrical Stimulation:         mins  09592 ( );  Dry Needling          mins self-pay    Timed Treatment:   55   mins   Total Treatment:     55   mins          Shamir Marley, PT  Physical Therapist

## 2021-09-20 ENCOUNTER — TREATMENT (OUTPATIENT)
Dept: PHYSICAL THERAPY | Facility: CLINIC | Age: 56
End: 2021-09-20

## 2021-09-20 DIAGNOSIS — Z98.890 STATUS POST LEFT ROTATOR CUFF REPAIR: Primary | ICD-10-CM

## 2021-09-20 DIAGNOSIS — R53.1 WEAKNESS: ICD-10-CM

## 2021-09-20 PROCEDURE — 97110 THERAPEUTIC EXERCISES: CPT | Performed by: PHYSICAL THERAPIST

## 2021-09-20 PROCEDURE — 97530 THERAPEUTIC ACTIVITIES: CPT | Performed by: PHYSICAL THERAPIST

## 2021-09-20 PROCEDURE — 97140 MANUAL THERAPY 1/> REGIONS: CPT | Performed by: PHYSICAL THERAPIST

## 2021-09-20 NOTE — PROGRESS NOTES
Physical Therapy Daily Progress Note    Patient Information  Doron Henderson  1965      Visit # : 15     Doron Henderson reports 0/10 pain today at rest.  Pt reports only stiffness over the weekend.  He is able to sleep without pain waking him up.          Objective Pt presents to PT today with no distress noted.      Supine flexion 173 degrees,  ER 93 degrees,  IR 68 degrees.     Bicep tendon proximal --tenderness     See Exercise, Manual, and Modality Logs for complete treatment.     Assessment/Plan  Pt with more sensitivity in the L proximal bicep tendon.  He is maybe having some more inflammation in the shoulder from the resisted wall slides.  We will hold those exercises over the week.       Progress per Plan of Care  Check response to the Ice massage.     Visit Diagnoses:    ICD-10-CM ICD-9-CM   1. Status post left rotator cuff repair  Z98.890 V45.89   2. Weakness  R53.1 780.79            Manual Therapy:    12     mins  40417;  Therapeutic Exercise:    31     mins  67225;     Neuromuscular Marina:        mins  37968;    Therapeutic Activity:     12     mins  44062;     Gait Training:           mins  32728;     Ultrasound:          mins  07920;    Electrical Stimulation:         mins  97220 ( );  Dry Needling          mins self-pay    Timed Treatment:   55   mins   Total Treatment:     55   mins          Shamir Marley, PT  Physical Therapist

## 2021-09-23 ENCOUNTER — TREATMENT (OUTPATIENT)
Dept: PHYSICAL THERAPY | Facility: CLINIC | Age: 56
End: 2021-09-23

## 2021-09-23 DIAGNOSIS — Z98.890 STATUS POST LEFT ROTATOR CUFF REPAIR: Primary | ICD-10-CM

## 2021-09-23 PROCEDURE — 97530 THERAPEUTIC ACTIVITIES: CPT | Performed by: PHYSICAL THERAPIST

## 2021-09-23 PROCEDURE — 97110 THERAPEUTIC EXERCISES: CPT | Performed by: PHYSICAL THERAPIST

## 2021-09-23 PROCEDURE — 97140 MANUAL THERAPY 1/> REGIONS: CPT | Performed by: PHYSICAL THERAPIST

## 2021-09-23 NOTE — PROGRESS NOTES
Physical Therapy Daily Progress Note      Patient: Doron Henderson   : 1965  Diagnosis/ICD-10 Code:  Status post left rotator cuff repair [Z98.890]  Referring practitioner: Tim Mckay MD  Date of Initial Visit: Type: THERAPY  Noted: 2021  Today's Date: 2021  Patient seen for 16 sessions           Subjective Doron Henderson reports: Pt reports pain 1/10 and points to anterior aspect of L shoulder. After ice massage previous treatment, he had relief from discomfort and it has felt better the last 2 days post that treatment    Objective   See Exercise, Manual, and Modality Logs for complete treatment. Cues throughout there ex for activation of scapular stabilizers for improved mechanics and decreasing stress on the anterior aspect of shoulder.  L shoulder flexion 171 degrees      Assessment/Plan   Pt cont to make improvement in strength and ROM. L biceps tendon is presenting as inflamed and may be overcompensating for RTC. Edu provided re: importance of attention to that discomfort and focus on quality of movement, especially in reaching anteriorly and overhead movements.    Progress per Plan of Care and Progress strengthening /stabilization /functional activity           Manual Therapy:    14     mins  68283;  Therapeutic Exercise:    25     mins  17963;     Neuromuscular Marina:        mins  24079;    Therapeutic Activity:     11     mins  98609;     Gait Training:           mins  24792;     Ultrasound:          mins  39188;    Electrical Stimulation:         mins  62787 ( );  Dry Needling          mins self-pay    Timed Treatment:   50   mins   Total Treatment:     50   mins    Jazmyn Barillas, PT  Physical Therapist

## 2021-09-27 ENCOUNTER — TREATMENT (OUTPATIENT)
Dept: PHYSICAL THERAPY | Facility: CLINIC | Age: 56
End: 2021-09-27

## 2021-09-27 DIAGNOSIS — Z98.890 STATUS POST LEFT ROTATOR CUFF REPAIR: Primary | ICD-10-CM

## 2021-09-27 DIAGNOSIS — R53.1 WEAKNESS: ICD-10-CM

## 2021-09-27 PROCEDURE — 97140 MANUAL THERAPY 1/> REGIONS: CPT | Performed by: PHYSICAL THERAPIST

## 2021-09-27 PROCEDURE — 97530 THERAPEUTIC ACTIVITIES: CPT | Performed by: PHYSICAL THERAPIST

## 2021-09-27 PROCEDURE — 97110 THERAPEUTIC EXERCISES: CPT | Performed by: PHYSICAL THERAPIST

## 2021-09-27 NOTE — PROGRESS NOTES
Physical Therapy Daily Progress Note    Patient Information  Doron Henderson  1965      Visit # : 17    Doron Henderson reports 0/10 pain today at rest.  Pt reports he is feeling better.  Now he can only feel the discomfort and pain in the front of the shoulder when he pushes on it.  No pain at rest.         Objective Pt presents to PT today with no distress noted.     Pt with improved ROM with less pain in the shoulder.       AROM supine  L shoulder flexion 173 degrees without pain.    PROM ER L shoulder 92 degrees.     Active ER is less due to strength in the L UE.      See Exercise, Manual, and Modality Logs for complete treatment.     Assessment/Plan  Pt with slow progress in strength.  His pain in the L bicep tendon is almost absent at this time.        Progress per Plan of Care and Progress strengthening /stabilization /functional activity      Visit Diagnoses:    ICD-10-CM ICD-9-CM   1. Status post left rotator cuff repair  Z98.890 V45.89   2. Weakness  R53.1 780.79            Manual Therapy:    9     mins  00551;  Therapeutic Exercise:    31     mins  96421;     Neuromuscular Marina:        mins  29225;    Therapeutic Activity:     12     mins  12159;     Gait Training:           mins  51638;     Ultrasound:          mins  78974;    Electrical Stimulation:         mins  58010 ( );  Dry Needling          mins self-pay    Timed Treatment:   52   mins   Total Treatment:     52   mins          Shamir Marley, PT  Physical Therapist

## 2021-10-05 ENCOUNTER — TREATMENT (OUTPATIENT)
Dept: PHYSICAL THERAPY | Facility: CLINIC | Age: 56
End: 2021-10-05

## 2021-10-05 DIAGNOSIS — Z98.890 STATUS POST LEFT ROTATOR CUFF REPAIR: Primary | ICD-10-CM

## 2021-10-05 DIAGNOSIS — R53.1 WEAKNESS: ICD-10-CM

## 2021-10-05 PROCEDURE — 97530 THERAPEUTIC ACTIVITIES: CPT | Performed by: PHYSICAL THERAPIST

## 2021-10-05 PROCEDURE — 97140 MANUAL THERAPY 1/> REGIONS: CPT | Performed by: PHYSICAL THERAPIST

## 2021-10-05 PROCEDURE — 97110 THERAPEUTIC EXERCISES: CPT | Performed by: PHYSICAL THERAPIST

## 2021-10-05 NOTE — PROGRESS NOTES
Physical Therapy Daily Progress Note    Patient Information  Doron Henderson  1965      Visit # : 18    Doron Henderson reports 0/10 pain today at rest.  Pt reports he still has some tightness in the front of the L shoulder with certain motions.          Objective Pt presents to PT today with no distress noted.      Supine flexion L shoulder 176 degrees,   ER 93 degrees.    Pt with improved strength in the L UE without pain elevation.     ER above 90 degrees abd is the weakest motion.  L shoulder flexion is still slightly limited due to irritation and weakness as well.       See Exercise, Manual, and Modality Logs for complete treatment.     Assessment/Plan  Pt has his best ROM today.  His strength is getting better as well.       Progress per Plan of Care and Progress strengthening /stabilization /functional activity      Visit Diagnoses:    ICD-10-CM ICD-9-CM   1. Status post left rotator cuff repair  Z98.890 V45.89   2. Weakness  R53.1 780.79            Manual Therapy:    9     mins  63002;  Therapeutic Exercise:    32     mins  54374;     Neuromuscular Marina:        mins  26408;    Therapeutic Activity:     11     mins  43692;     Gait Training:           mins  48315;     Ultrasound:          mins  46472;    Electrical Stimulation:         mins  77224 ( );  Dry Needling          mins self-pay    Timed Treatment:   52   mins   Total Treatment:     52   mins          Shamir Marley, PT  Physical Therapist

## 2021-10-07 ENCOUNTER — TREATMENT (OUTPATIENT)
Dept: PHYSICAL THERAPY | Facility: CLINIC | Age: 56
End: 2021-10-07

## 2021-10-07 DIAGNOSIS — Z98.890 STATUS POST LEFT ROTATOR CUFF REPAIR: Primary | ICD-10-CM

## 2021-10-07 DIAGNOSIS — R53.1 WEAKNESS: ICD-10-CM

## 2021-10-07 PROCEDURE — 97110 THERAPEUTIC EXERCISES: CPT | Performed by: PHYSICAL THERAPIST

## 2021-10-07 PROCEDURE — 97530 THERAPEUTIC ACTIVITIES: CPT | Performed by: PHYSICAL THERAPIST

## 2021-10-07 NOTE — PROGRESS NOTES
Physical Therapy Daily Progress Note    Patient Information  Doron Henderson  1965      Visit # : 19    Doron Henderson reports 0/10 pain today at rest.  Pt reports he is feeling pretty good.          Objective Pt presents to PT today with no distress noted.     Pt with no discomfort in the anterior shoulder with exercise activity.  He is getting more functional strength without pain.       Strength below 90 degrees 4/5 in the L UE,  Strength above 90 degrees is 2+/5-3/5 depending on how far he is elevated.     See Exercise, Manual, and Modality Logs for complete treatment.     Assessment/Plan  Pt is getting better and performing more functional related activity without pain.       Progress per Plan of Care and Progress strengthening /stabilization /functional activity      Visit Diagnoses:    ICD-10-CM ICD-9-CM   1. Status post left rotator cuff repair  Z98.890 V45.89   2. Weakness  R53.1 780.79            Manual Therapy:         mins  51857;  Therapeutic Exercise:    41     mins  49105;     Neuromuscular Marina:        mins  19340;    Therapeutic Activity:     13     mins  85038;     Gait Training:           mins  78459;     Ultrasound:          mins  24385;    Electrical Stimulation:         mins  24900 ( );  Dry Needling          mins self-pay    Timed Treatment:   54   mins   Total Treatment:     54   mins          Shamir Marley, PT  Physical Therapist

## 2021-10-13 ENCOUNTER — OFFICE VISIT (OUTPATIENT)
Dept: ORTHOPEDIC SURGERY | Facility: CLINIC | Age: 56
End: 2021-10-13

## 2021-10-13 VITALS
DIASTOLIC BLOOD PRESSURE: 92 MMHG | BODY MASS INDEX: 38.5 KG/M2 | HEIGHT: 71 IN | SYSTOLIC BLOOD PRESSURE: 162 MMHG | WEIGHT: 275 LBS | HEART RATE: 89 BPM

## 2021-10-13 DIAGNOSIS — Z98.890 S/P LEFT ROTATOR CUFF REPAIR: Primary | ICD-10-CM

## 2021-10-13 PROCEDURE — 99213 OFFICE O/P EST LOW 20 MIN: CPT | Performed by: ORTHOPAEDIC SURGERY

## 2021-10-13 NOTE — PROGRESS NOTES
"      [facility] Orthopa[facility] Surgery [facility] Note    Subjective     CC: Follow-up (1 month follow up; 4 months s/p (L) shoulder arthroscopy with RCR repair, using double row repair with Smith and Nephew anchors and Regeneten Collagen implant 06/10/2021 )      HPI    Doron Henderson is a 56 y.o. male.  He is doing well.  He feels ready to return to regular work full duty on Monday    Review of Systems   Constitutional: Negative.  Negative for chills, fatigue and fever.   HENT: Negative.  Negative for congestion and dental problem.    Eyes: Negative.  Negative for blurred vision.   Respiratory: Negative.  Negative for shortness of breath.    Cardiovascular: Negative.  Negative for leg swelling.   Gastrointestinal: Negative.  Negative for abdominal pain.   Endocrine: Negative.  Negative for polyuria.   Genitourinary: Negative.  Negative for difficulty urinating.   Musculoskeletal: Positive for arthralgias.   Skin: Negative.    Allergic/Immunologic: Negative.    Neurological: Negative.    Hematological: Negative.  Negative for adenopathy.   Psychiatric/Behavioral: Negative.  Negative for behavioral problems.       ROS:    Constiutional:Pt denies fever, chills, nausea, or vomiting.  MSK:as above      Objective      Past Medical History  Past Medical History:   Diagnosis Date   • High blood pressure          Physical Exam  /92   Pulse 89   Ht 180.3 cm (70.98\")   Wt 125 kg (275 lb)   BMI 38.37 kg/m²     Body mass index is 38.37 kg/m².    Patient is well nourished and well developed.        Ortho Exam  Left shoulder has full motion and full strength    Imaging/Labs/EMG Reviewed:  Imaging Results (Last 24 Hours)     ** No results found for the last 24 hours. **          Assessment:  1. S/P left rotator cuff repair        Plan:  Recommend over the counter anti-inflammatories for pain and/or swelling  He is doing well and is at [facility] with the ability to return to work on October 18 full duty without restrictions    Follow " Up:   Return if symptoms worsen or fail to improve.      Medical Decision Making  Management Options : Low - OTC Drugs        Tim Mckay M.D., [facility]  Orthopedic Surgeon  Fellowship Trained Sports Medicine  [facility]  Orthopedics and Sports Medicine  [address], [address]  [place] [address]    EMR Dragon/Transcription disclaimer:  Much of this encounter note is an electronic transcription of spoken language to printed text. Electronic transcription of spoken language may permit erroneous, or at times, nonsensical words or phrases to be inadvertently transcribed. Although I have reviewed the note for such errors, some may still exist.